# Patient Record
Sex: FEMALE | Race: WHITE | NOT HISPANIC OR LATINO | Employment: OTHER
[De-identification: names, ages, dates, MRNs, and addresses within clinical notes are randomized per-mention and may not be internally consistent; named-entity substitution may affect disease eponyms.]

---

## 2019-10-01 ENCOUNTER — HEALTH MAINTENANCE LETTER (OUTPATIENT)
Age: 74
End: 2019-10-01

## 2019-12-15 ENCOUNTER — HEALTH MAINTENANCE LETTER (OUTPATIENT)
Age: 74
End: 2019-12-15

## 2021-01-15 ENCOUNTER — HEALTH MAINTENANCE LETTER (OUTPATIENT)
Age: 76
End: 2021-01-15

## 2021-09-04 ENCOUNTER — HEALTH MAINTENANCE LETTER (OUTPATIENT)
Age: 76
End: 2021-09-04

## 2021-10-30 ENCOUNTER — HEALTH MAINTENANCE LETTER (OUTPATIENT)
Age: 76
End: 2021-10-30

## 2022-02-19 ENCOUNTER — HEALTH MAINTENANCE LETTER (OUTPATIENT)
Age: 77
End: 2022-02-19

## 2022-09-22 ENCOUNTER — TRANSFERRED RECORDS (OUTPATIENT)
Dept: HEALTH INFORMATION MANAGEMENT | Facility: CLINIC | Age: 77
End: 2022-09-22

## 2022-09-26 ENCOUNTER — TRANSFERRED RECORDS (OUTPATIENT)
Dept: HEALTH INFORMATION MANAGEMENT | Facility: CLINIC | Age: 77
End: 2022-09-26

## 2022-10-16 ENCOUNTER — HEALTH MAINTENANCE LETTER (OUTPATIENT)
Age: 77
End: 2022-10-16

## 2022-10-24 ENCOUNTER — TRANSFERRED RECORDS (OUTPATIENT)
Dept: HEALTH INFORMATION MANAGEMENT | Facility: CLINIC | Age: 77
End: 2022-10-24

## 2023-01-27 ENCOUNTER — TRANSFERRED RECORDS (OUTPATIENT)
Dept: HEALTH INFORMATION MANAGEMENT | Facility: CLINIC | Age: 78
End: 2023-01-27

## 2023-03-29 ENCOUNTER — TRANSFERRED RECORDS (OUTPATIENT)
Dept: HEALTH INFORMATION MANAGEMENT | Facility: CLINIC | Age: 78
End: 2023-03-29

## 2023-04-01 ENCOUNTER — HEALTH MAINTENANCE LETTER (OUTPATIENT)
Age: 78
End: 2023-04-01

## 2023-05-18 ENCOUNTER — TRANSFERRED RECORDS (OUTPATIENT)
Dept: HEALTH INFORMATION MANAGEMENT | Facility: CLINIC | Age: 78
End: 2023-05-18

## 2023-08-25 NOTE — TELEPHONE ENCOUNTER
FUTURE VISIT INFORMATION:      FUTURE VISIT INFORMATION:  Date: 11/21/23   Time: 2 PM with SLP  [place] [facility] [place]FORMATION:  Referring provider:   Referring providers clinic:   Reason for visit/diagnosis:  New per pt, having globus issues, voice etc. Was seeing [place] [facility] who told her to come-no formal ref. req'd pt to have med recs sent.     RECORDS REQUESTED FROM:       Clinic name Comments Records Status Imaging Status   [facility] [address] Speech Language Pathology    6/28/23 note- Faviola Amato, SLP    CE    Imaging XR Chest 8/16/23  [place] Video Swallow 6/28/23 CE Req 8/25/23  PACS   [facility] - ENT   8/16/23- note Maria Ines Campbell PA-C    6/7/23 and  12/21/22 note- Ariane Whatley MD   CE    [facility] Gastroenterology     6/18/20 note- Jony Holbrook MD   CE    [facility] Gastroenterology  P: [phone]  F: [phone] 2022- 2023 progress notes - Jamey Leong, DO     9/28/22 EGD Pending    Received, send to scan 10/5    [address], [address]  [place] [address]  Tel: [phone]  Fax: [phone] 3/29/2023 Video swallow  [employer]:  [id number] Pending    Received, send to scan 10/5 Pending req  Req 10/9/23    Disc received 10/12/23      August 25, 2023 at 4:50 PM - request recs from [facility] and images pushed from [facility]  September 13, 2023 12:33 PM - Received image in pacs and attached it to patient- Ashley   September 13, 2023 12:33 PM - Called [facility] to check the status but they do not have her in the system and had no records on file - Ashley   October 3, 2023 at 2:58 PM - Called the patient to discuss outside records GI in [place]. Patient stated she saw Dr [facility]ong [facility] [place]. She had EGD one year ago in September. Also had a barrium study and modify swallow. Patient does have copy of her records and can send to me via email. Provided my email address. Patient is not able to send it today but will  do that tomorrow, sometimes this week. -[facility]  October 5, 2023 at 9:23 AM - received from patient records from GI visit in [place]. Send to scan. TYRESE emailed to obtain image from [facility] center -[facility]  October 9, 2023 at 9:13 AM - Received signed TYRESE for [facility] and request for disc. TYRESE send to scanning -[facility]  October 12, 2023 10:01 AM - Received imaging disc 3/29/23 from [facility] and dropped off at [facility] to upload to PACS.-[facility]  October 13, 2023 4:00 PM - Disc broken. Sent message to [facility] to request new disc to upload. - Marilyn  October 16, 2023 10:23 AM - Resend and request for new disc from [facility] [facility]  October 23, 2023 7:15 AM - Received new disc from [email or web address] and dropped off at [facility] to upload to PACS.-Edwina

## 2023-09-19 ENCOUNTER — MEDICAL CORRESPONDENCE (OUTPATIENT)
Dept: HEALTH INFORMATION MANAGEMENT | Facility: CLINIC | Age: 78
End: 2023-09-19

## 2023-11-20 ENCOUNTER — TELEPHONE (OUTPATIENT)
Dept: OTOLARYNGOLOGY | Facility: CLINIC | Age: 78
End: 2023-11-20
Payer: COMMERCIAL

## 2023-11-21 ENCOUNTER — PRE VISIT (OUTPATIENT)
Dept: OTOLARYNGOLOGY | Facility: CLINIC | Age: 78
End: 2023-11-21

## 2023-11-21 ENCOUNTER — THERAPY VISIT (OUTPATIENT)
Dept: SPEECH THERAPY | Facility: CLINIC | Age: 78
End: 2023-11-21
Payer: COMMERCIAL

## 2023-11-21 ENCOUNTER — OFFICE VISIT (OUTPATIENT)
Dept: OTOLARYNGOLOGY | Facility: CLINIC | Age: 78
End: 2023-11-21
Payer: COMMERCIAL

## 2023-11-21 VITALS — SYSTOLIC BLOOD PRESSURE: 184 MMHG | DIASTOLIC BLOOD PRESSURE: 102 MMHG | HEART RATE: 74 BPM

## 2023-11-21 DIAGNOSIS — R09.A2 GLOBUS SENSATION: Primary | ICD-10-CM

## 2023-11-21 DIAGNOSIS — R09.89 CHRONIC THROAT CLEARING: ICD-10-CM

## 2023-11-21 DIAGNOSIS — J38.7 LARYNGEAL HYPERFUNCTION: ICD-10-CM

## 2023-11-21 DIAGNOSIS — R13.12 DYSPHAGIA, OROPHARYNGEAL PHASE: Primary | ICD-10-CM

## 2023-11-21 DIAGNOSIS — R49.0 DYSPHONIA: ICD-10-CM

## 2023-11-21 DIAGNOSIS — J38.7 IRRITABLE LARYNX SYNDROME: ICD-10-CM

## 2023-11-21 DIAGNOSIS — R49.0 DYSPHONIA: Primary | ICD-10-CM

## 2023-11-21 PROCEDURE — 92524 BEHAVRAL QUALIT ANALYS VOICE: CPT | Mod: GN | Performed by: SPEECH-LANGUAGE PATHOLOGIST

## 2023-11-21 PROCEDURE — 31579 LARYNGOSCOPY TELESCOPIC: CPT | Performed by: OTOLARYNGOLOGY

## 2023-11-21 PROCEDURE — 99204 OFFICE O/P NEW MOD 45 MIN: CPT | Mod: 25 | Performed by: OTOLARYNGOLOGY

## 2023-11-21 PROCEDURE — 92610 EVALUATE SWALLOWING FUNCTION: CPT | Mod: GN | Performed by: SPEECH-LANGUAGE PATHOLOGIST

## 2023-11-21 PROCEDURE — 92612 ENDOSCOPY SWALLOW (FEES) VID: CPT | Mod: GN | Performed by: OTOLARYNGOLOGY

## 2023-11-21 PROCEDURE — 92613 ENDOSCOPY SWALLOW (FEES) I&R: CPT | Performed by: OTOLARYNGOLOGY

## 2023-11-21 RX ORDER — IPRATROPIUM BROMIDE 42 UG/1
SPRAY, METERED NASAL
COMMUNITY
Start: 2023-11-06

## 2023-11-21 RX ORDER — UBIDECARENONE 100 MG
CAPSULE ORAL
COMMUNITY

## 2023-11-21 RX ORDER — DIPHENOXYLATE HYDROCHLORIDE AND ATROPINE SULFATE 2.5; .025 MG/1; MG/1
1 TABLET ORAL DAILY
COMMUNITY

## 2023-11-21 RX ORDER — FAMOTIDINE 40 MG/1
40 TABLET, FILM COATED ORAL 2 TIMES DAILY
COMMUNITY

## 2023-11-21 RX ORDER — GLUCOSAMINE/CHONDR SU A SOD 750-600 MG
TABLET ORAL DAILY
COMMUNITY

## 2023-11-21 RX ORDER — LORAZEPAM 0.5 MG/1
TABLET ORAL
COMMUNITY

## 2023-11-21 RX ORDER — ESTRADIOL 10 UG/1
10 INSERT VAGINAL
COMMUNITY

## 2023-11-21 RX ORDER — PHENOL 1.4 %
600 AEROSOL, SPRAY (ML) MUCOUS MEMBRANE
COMMUNITY

## 2023-11-21 RX ORDER — ATORVASTATIN CALCIUM 40 MG/1
TABLET, FILM COATED ORAL DAILY
COMMUNITY

## 2023-11-21 RX ORDER — SUCRALFATE 1 G/1
TABLET ORAL
COMMUNITY
Start: 2023-01-15

## 2023-11-21 RX ORDER — LEVOTHYROXINE SODIUM 100 UG/1
TABLET ORAL DAILY
COMMUNITY

## 2023-11-21 RX ORDER — CETIRIZINE HYDROCHLORIDE 10 MG/1
10 TABLET ORAL DAILY
COMMUNITY

## 2023-11-21 ASSESSMENT — PAIN SCALES - GENERAL: PAINLEVEL: NO PAIN (0)

## 2023-11-21 NOTE — PATIENT INSTRUCTIONS
1.  You were seen in the ENT Clinic today by . If you have any questions or concerns after your appointment, please call [phone]. Press option #1 for scheduling related needs. Press option #3 for Nurse advice.    2.   has recommended  the following:   - voice therapy   - discuss referral to dietician with your primary care provider    3.  Plan is to return to clinic after last session of voice therapy      Lydia Sheth LPN  [phone]  [facility] Otolaryngology

## 2023-11-21 NOTE — PROGRESS NOTES
[facility]   at the [facility]   Otolaryngology Clinic     Patient: Soraida Deshpande    MRN: [id number]    : 1945    Age/Gender: 78 year old female  Date of Service: 2023  Rendering Provider:   Mary Calderon MD     Referring Provider   PCP: Kumar Haley  Referring Physician: Donald Stephens MD  No address on file  Reason for Consultation   Globus sensation  Dysphonia  History   HISTORY OF PRESENT ILLNESS: Ms. Deshpande is a 78 year old female who presents to us today with globus sensation and dysphonia.      Of note she had a bilateral thyroidectomy in     she presents today with her  for evaluation. she reports:    Globus sensation  - unsure if she has silent reflux, because mucus collects in her throat  - the mucus in the back of her throat, voice changes, and PND is what bothers her the most- began around 2 years ago  - lost 20 pounds, and believes it is because of the silent reflux- her weight dropped pretty fast  - an allergist told her she may have silent reflux, which prompted change in diet and weight loss   - change toothpaste to help with reflux- brushes her teeth 3 times daily  -  Has a constant bitter taste with coughing and throat clearing- more throat clearing than coughing  - unsure if coughing makes her clear her throat more  - coughs sometimes when she lays down at night- doesn't wake up from coughing  - had a previous sore throat because of reflux- does currently take reflux meds  - consistent ear infections   - was treated with Levaquin for pseudomonias  - saw 3 ENTS and a pulmonologist in [place], who performed a fiberoptic bronchoscopy  - has to have a total 6 cultures- her most recent culture resulted in microbacterium, a lung infection  - had a regular upper GI endoscopy one year ago  - following a diet of no chocolate, no carbonated drinks, no salty or sweet foods  -  doesn't eat after dinner- eats 4 hours before bedtime  - feels a constant  sensation of something getting caught in your throat- getting food down her throat helps  - if she has a completely clean meal and watches what she eats, the sensation will go away- almond milk sometimes bother her, worse at night than in the morning  - doesn't seem like talking makes the sensation worse  - not eating makes the sensation better  - hoarseness of voice is worse with talking- noticed while conversing with friends  - sleeps elevated at nighttime  - breathing seems to be okay- still waiting on results from pulmonologist  - hurts a little when taking a deep breath- can take her daily evening walk  - her  says she does ist down and take a break at the end of her daily walk  - has used neti pot and sinus rinses for post-nasal drip- stopped using neti pot, as she developed an infection        PAST MEDICAL HISTORY:   Past Medical History:   Diagnosis Date    ABSENCE OF MENSTRUATION     postmenopausal    FX HUMERUS SHAFT-CLOSED     URIN TRACT INFECTION NOS     recurrent       PAST SURGICAL HISTORY:   Past Surgical History:   Procedure Laterality Date    NO HISTORY OF SURGERY         CURRENT MEDICATIONS:   Current Outpatient Medications:     ASPIRIN 81 MG OR TABS,    1 po qd, Disp: , Rfl:     B COMPLEX 1 OR TABS,    1 po qd, Disp: , Rfl:     CALCIUM + D 600-200 MG-IU OR TABS,       2 po qd, Disp: , Rfl:     FEMHRT 1/5 1-5 MG-MCG OR TABS,    1 po qd, Disp: , Rfl:     FISH OIL 1000 MG OR CAPS,     2 grams po qd, Disp: , Rfl:     FLAX SEED OIL 1000 MG OR CAPS, daily, Disp: , Rfl: 0    FOLIC ACID 400 MCG OR TABS, 1 TABLET DAILY, Disp: 30, Rfl: 0    VITAMIN C 500 MG OR TABS,    1 po qd, Disp: , Rfl:     VITAMIN E 400 IU OR CAPS,    1 po qd, Disp: , Rfl:     ALLERGIES: Trimethoprim    SOCIAL HISTORY:    Social History     Socioeconomic History    Marital status:      Spouse name: Not on file    Number of children: Not on file    Years of education: Not on file    Highest education level: Not on file    Occupational History    Not on file   Tobacco Use    Smoking status: Former     Types: Cigarettes     Quit date: 1975     Years since quittin.0    Smokeless tobacco: Not on file    Tobacco comments:     quit at age 30 after smoking for 6 years at 1 ppd   Substance and Sexual Activity    Alcohol use: Yes     Comment: 7-10 per week    Drug use: No    Sexual activity: Not on file   Other Topics Concern    Not on file   Social History Narrative    Not on file     Social Determinants of Health     Financial Resource Strain: Not on file   Food Insecurity: Not on file   Transportation Needs: Not on file   Physical Activity: Not on file   Stress: Not on file   Social Connections: Not on file   Interpersonal Safety: Not on file   Housing Stability: Not on file         FAMILY HISTORY:   Family History   Problem Relation Age of Onset    C.A.D. Father         first MI in his 40's    Cerebrovascular Disease Father     Osteoporosis Mother     Cardiovascular Mother         carotid endarterectomy, pacemaker    Hypertension Mother     Breast Cancer Paternal Aunt     Cancer - colorectal Maternal Aunt      Non-contributory for problems with anesthesia    REVIEW OF SYSTEMS:   The patient was asked a 14 point review of systems regarding constitutional symptoms, eye symptoms, ears, nose, mouth, throat symptoms, cardiovascular symptoms, respiratory symptoms, gastrointestinal symptoms, genitourinary symptoms, musculoskeletal symptoms, integumentary symptoms, neurological symptoms, psychiatric symptoms, endocrine symptoms, hematologic/lymphatic symptoms, and allergic/ immunologic symptoms.   The pertinent factors have been included in the HPI and below.  Patient Supplied Answers to Review of Systems       No data to display                Physical Examination   The patient underwent a physical examination as described below. The pertinent positive and negative findings are summarized after the description of the  examination.  Constitutional: The patient's developmental and nutritional status was assessed. The patient's voice quality was assessed.  Head and Face: The head and face were inspected for deformities. The sinuses were palpated. The salivary glands were palpated. Facial muscle strength was assessed bilaterally.  Eyes: Extraocular movements and primary gaze alignment were assessed.  Ears, Nose, Mouth and Throat: The ears and nose were examined for deformities. The nasal septum, mucosa, and turbinates were inspected by anterior rhinoscopy. The lips, teeth, and gums were examined for abnormalities. The oral mucosa, tongue, palate, tonsils, lateral and posterior pharynx were inspected for the presence of asymmetry or mucosal lesions.    Neck: The tracheal position was noted, and the neck mass palpated to determine if there were any asymmetries, abnormal neck masses, thyromegally, or thyroid nodules.  Respiratory: The nature of the breathing and chest expansion/symmetry was observed.  Cardiovascular: The patient was examined to determine the presence of any edema or jugular venous distension.  Abdomen: The contour of the abdomen was noted.  Lymphatic: The patient was examined for infraclavicular lymphadenopathy.  Musculoskeletal: The patient was inspected for the presence of skeletal deformities.  Extremities: The extremities were examined for any clubbing or cyanosis.  Skin: The skin was examined for inflammatory or neoplastic conditions.  Neurologic: The patient's orientation, mood, and affect were noted. The cranial nerve  functions were examined.  Other pertinent positive and negative findings on physical examination:      OC/OP: no lesions, uvula midline, soft palate elevates symmetrically   Neck: no lesions, no TH tenderness to palpation, with sensation to cough and throat clear     All other physical examination findings were within normal limits and noncontributory.  Procedures     Video Laryngoscopy with  Stroboscopy (CPT 28924)    Preoperative Diagnosis:  Dysphonia and throat symptoms  Postoperative Diagnosis: Dysphonia and throat symptoms  Indication:  Patient has new or persistent dysphonia and throat symptoms.  This requires evaluation by stroboscopy to fully delineate the laryngeal functioning; especially mucosal wave assessment, ultrasharp visualization of lesions on the vocal folds, and overall functioning of the larynx.  Details of Procedure: A 70 degree rigid telescopic laryngoscope or a distal chip flexible scope was used. The lighting was obtained via a light cable connected to a stroboscopic unit. The telescope was inserted either transorally or transnasally until the vocal folds could be visualized. The patient was instructed to sustain the vowel  ee  at a comfortable pitch and loudness as the voice was monitored by a microphone connected to a fundamental frequency tracker. This circuit tracked vocal periodicity, allowing the light to flash in synchrony with the glottal cycles. A setting on the stroboscope was set to change the phase of light strobing with relation to the vocal fundamental frequency, producing an image of 1 to 2 glottal cycles every second. The video images were recorded for analysis. Use of the variable speed, slow and stop scan allowed careful study of pertinent segments of laryngeal function to increase accuracy of clinical assessments of function and dysfunction.  In particular, features of glottal closure, mucosal wave on the vocal fold cover and laryngeal symmetry were analyzed. Lastly, the patient was asked to phonate speech samples and auditory/perceptual evaluation of voice and resonance were performed.  The vocal quality was carefully evaluated for hoarseness, breathiness, loudness, phrase length and intelligibility to determine the source of dysphonia.    Findings:      B. LARYNGOVIDEOSTROBOSCOPY   Anatomic/Physiological Deviations:  RNC, left false vocal fold swelling that  impairs vibration of the left true vocal fold, no pooling of saliva  Mucosal wave: Right:  No restriction     Left: Little restriction  Bilateral Vocal Fold Vibration: Asymmetric  Vocal Process: Right: No restriction    Left:  No restriction  Vocal Fold closure: Complete glottal closure    Complication(s): None  Disposition: Patient tolerated the procedure well         Fiberoptic Endoscopic Evaluation of Swallowing (CPT 16311)  and Interpretation of Swallowing (CPT 56046)    Indications: See above notes for complete history and physical. Patient complains of dysphagia to both solids and liquids and/or there is suggestion on history and endoscopic exam of the presence of dysphagia causing medical complaints.  Swallowing evaluation is being performed to assess the presence and degree of dysphagia, and to recommend a safe diet.     Pulmonary Status:  No PNA   Current Diet:              regular                                        thin liquids      Consistency Amounts:  Thin Liquid: sip   Puree: sip  Solid: cookies            Positioning: upright in a chair  Oral Peripheral Exam: See physical exam section.  Anatomic Notes: See Videostroboscopy report for assessment of anatomy and laryngeal functioning  Pharyngeal secretions prior to administration of liquid or food: No    Oral Phase Abnormal Findings: No abnormal behavior observed    Pharyngeal Phase Abnormal Findings: no penetration, no aspiration , throat cleared with apple juice    Recommended Diet:  regular                                        thin liquids            Review of Relevant Clinical Data   I personally reviewed:  Notes: Dr. Ariane Whatley, ENT-[facility], 6/7/23  Assessment and Plan  Soraida Deshpande is a 78 y.o. female has symptoms of globus, postnasal drip, hoarseness. 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 She has several other additional  concerns today which are addressed as below.    Sleep referral ordered    Defer CT chest follow up to primary    Modified Barium swallow study ordered to assess aspiration     Patulous eustachian tube - balance between nose and ear - can trial and error with nasal sprays, zyrtec, etc to find a good balance. Could try to treat however this would make nasal congestion worse.     Vasomotor rhinitis - atrovent nasal spray as needed    White nodule in trachea unchanged -> no need to follow up    Muscle tension dysphonia, prebylarynx-> consider speech therapy in [place] - video visit possible     Dr. Jony Holbrook, GastroENT, 6/18/20  Assessment/Plan:  In summary: Soraida Deshpande had epigastric burning discomfort that responded to stopping ibuprofen, avoiding coffee, alcohol, chocolate and spicy foods, and taking Nexium for 6 weeks. Acid peptic disease was suspected. She feels well today and is not interested in further evaluation at this time. I asked her to stay off ibuprofen in the future. She will follow up in GI clinic as needed. If the epigastric discomfort returns we will consider checking stool for Helicobacter pylori and/or scheduling an endoscopy.    6/28/23      Radiology: Chest Xray 8/16/23  IMPRESSION:  Centrilobular nodularity demonstrated within the right upper lobe, pneumonia versus sequela of prior infectious/inflammatory process. Consider follow-up radiograph in one month to ensure resolution. Normal cardiomediastinal silhouette and pulmonary vasculature. No pneumothorax or pleural effusion. Bony thorax is unremarkable.      Pathology: Gastrointestinal pathology report 9/28/22    Procedures: VFSS 6/28/23  Impression: within normal limits    Upper GI 6/28/23  Impression: There was no aspiration or penetration with any consistency.     Bronchoscopy 10/23/23  Impression:  1. Generalized bronchiectasis  2. Copious amount of secretions in the right upper lobe, right middle lobe, superior segment right lower lobe  "and lingula  3. Excessive dynamic compression in the main trachea and mainstem bronchi and also in the right lower lobe bronchi  4. No endobronchial lesion seen         Labs:  Lab Results   Component Value Date    TSH 0.86 11/22/2005     No results found for: \"NA\", \"CO2\", \"BUN\", \"CREAT\", \"GLUCOSE\", \"PHOS\"  No results found for: \"WBC\", \"HGB\", \"HCT\", \"MCV\", \"PLT\"  No results found for: \"PT\", \"PTT\", \"INR\"  No results found for: \"DALLAS\"  No components found for: \"RHEUMATOIDFACTOR\", \"RF\"  No results found for: \"CRP\"  No components found for: \"CKTOT\", \"URICACID\"  No components found for: \"C3\", \"C4\", \"DSDNAAB\", \"NDNAABIFA\"  No results found for: \"MPOAB\"    Patient reported Quality of Life (QOL) Measures   Patient Supplied Answers To VHI Questionnaire       No data to display                  Patient Supplied Answers To EAT Questionnaire       No data to display                  Patient Supplied Answers To CSI Questionnaire       No data to display                  Patient Supplied Answers to Dyspnea Index Questionnaire:       No data to display                Impression & Plan     IMPRESSION: Ms. Deshpande is a 78 year old female who is being seen for the following:    Chronic cough/throat clearing   - started 2 years ago   - has improved overtime  - associated with sensation of mucus  - has associated sensation of globus sensation  - denies cigarettes  - allergy triggers and work up: reports negative work up  - pulmonary triggers and work up: does have pulmonary infection on bronchoscopy, cough improved once she started treatment for this - she is still undergoing treatment  - GI triggers and work up: denies reflux, denies AM LPRD symptoms, started antireflux diet because this helps her mucus build up - she lost 20 lbs because of this - worried that reflux is making her lung infection worse, had EGD with finding of inlet patch   - ENT triggers and work up: throat clears more with talking  Xray Video Swallow Exam 6/2023 reviewed " today, looks normal   - scope shows left false vocal fold swelling that impairs vibration of the left true vocal fold, no pooling of saliva  - FEES shows no penetration, no aspiration, throat cleared with apple juice  - symptoms likely due to pulmonary etiology given infection, but now she also has irritable larynx syndrome and muscle tension dysphonia - she had throat clearing with the apple juice pointing to irritable larynx syndrome   Plan  - cough/throat clearing suppression therapy  - continue antireflux diet and protocol (H2 blocker and gaviscon)  - I do not think she has silent reflux given no morning AM LPRD - I think it is rather irritable larynx syndrome   - discussed reflux would make diagnoses as asthma worse rather than a lung infection         2. 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     Provider Disclosure:  I agree with above History, Review of Systems, Physical exam and Plan.  I have reviewed the content of the documentation and have edited it as needed. I have personally performed the services documented here and the documentation accurately represents those services and  the decisions I have made.        Thank you for the kind referral and for allowing me to share in the care of Ms. Deshpande. If you have any questions, please do not hesitate to contact me.    Mary Calderon MD    Laryngology    [facility]  Department of  Otolaryngology - Head and Neck Surgery  Clinics & Surgery Center  [address] [place] [id number]  Appointment line: [phone]  Fax: [phone]  [email or web address]

## 2023-11-21 NOTE — LETTER
2023       RE: Soraida Deshpande  [address] [facility] [id number]     Dear Colleague,    Thank you for referring your patient, Soraida Deshpande, to the [facility] EAR NOSE AND THROAT [facility]INIC [place] at [facility]. Please see a copy of my visit note below.        [facility]   at the [facility]   Otolaryngology Clinic     Patient: Soraida Deshpande    MRN: [id number]    : 1945    Age/Gender: 78 year old female  Date of Service: 2023  Rendering Provider:   Mary Calderon MD     Referring Provider   PCP: Kumar Haley  Referring Physician: Referred MD Angelo  No address on file  Reason for Consultation   Globus sensation  Dysphonia  History   HISTORY OF PRESENT ILLNESS: Ms. Deshpande is a 78 year old female who presents to us today with globus sensation and dysphonia.      Of note she had a bilateral thyroidectomy in     she presents today with her  for evaluation. she reports:    Globus sensation  - unsure if she has silent reflux, because mucus collects in her throat  - the mucus in the back of her throat, voice changes, and PND is what bothers her the most- began around 2 years ago  - lost 20 pounds, and believes it is because of the silent reflux- her weight dropped pretty fast  - an allergist told her she may have silent reflux, which prompted change in diet and weight loss   - change toothpaste to help with reflux- brushes her teeth 3 times daily  -  Has a constant bitter taste with coughing and throat clearing- more throat clearing than coughing  - unsure if coughing makes her clear her throat more  - coughs sometimes when she lays down at night- doesn't wake up from coughing  - had a previous sore throat because of reflux- does currently take reflux meds  - consistent ear infections   - was treated with Levaquin for pseudomonias  - saw 3 ENTS and a pulmonologist in [place], who performed a fiberoptic  bronchoscopy  - has to have a total 6 cultures- her most recent culture resulted in microbacterium, a lung infection  - had a regular upper GI endoscopy one year ago  - following a diet of no chocolate, no carbonated drinks, no salty or sweet foods  -  doesn't eat after dinner- eats 4 hours before bedtime  - feels a constant sensation of something getting caught in your throat- getting food down her throat helps  - if she has a completely clean meal and watches what she eats, the sensation will go away- almond milk sometimes bother her, worse at night than in the morning  - doesn't seem like talking makes the sensation worse  - not eating makes the sensation better  - hoarseness of voice is worse with talking- noticed while conversing with friends  - sleeps elevated at nighttime  - breathing seems to be okay- still waiting on results from pulmonologist  - hurts a little when taking a deep breath- can take her daily evening walk  - her  says she does ist down and take a break at the end of her daily walk  - has used neti pot and sinus rinses for post-nasal drip- stopped using neti pot, as she developed an infection        PAST MEDICAL HISTORY:   Past Medical History:   Diagnosis Date    ABSENCE OF MENSTRUATION     postmenopausal    FX HUMERUS SHAFT-CLOSED     URIN TRACT INFECTION NOS     recurrent       PAST SURGICAL HISTORY:   Past Surgical History:   Procedure Laterality Date    NO HISTORY OF SURGERY         CURRENT MEDICATIONS:   Current Outpatient Medications:     ASPIRIN 81 MG OR TABS,    1 po qd, Disp: , Rfl:     B COMPLEX 1 OR TABS,    1 po qd, Disp: , Rfl:     CALCIUM + D 600-200 MG-IU OR TABS,       2 po qd, Disp: , Rfl:     FEMHRT 1/5 1-5 MG-MCG OR TABS,    1 po qd, Disp: , Rfl:     FISH OIL 1000 MG OR CAPS,     2 grams po qd, Disp: , Rfl:     FLAX SEED OIL 1000 MG OR CAPS, daily, Disp: , Rfl: 0    FOLIC ACID 400 MCG OR TABS, 1 TABLET DAILY, Disp: 30, Rfl: 0    VITAMIN C 500 MG OR TABS,    1 po qd,  Disp: , Rfl:     VITAMIN E 400 IU OR CAPS,    1 po qd, Disp: , Rfl:     ALLERGIES: Trimethoprim    SOCIAL HISTORY:    Social History     Socioeconomic History    Marital status:      Spouse name: Not on file    Number of children: Not on file    Years of education: Not on file    Highest education level: Not on file   Occupational History    Not on file   Tobacco Use    Smoking status: Former     Types: Cigarettes     Quit date: 1975     Years since quittin.0    Smokeless tobacco: Not on file    Tobacco comments:     quit at age 30 after smoking for 6 years at 1 ppd   Substance and Sexual Activity    Alcohol use: Yes     Comment: 7-10 per week    Drug use: No    Sexual activity: Not on file   Other Topics Concern    Not on file   Social History Narrative    Not on file     Social Determinants of Health     Financial Resource Strain: Not on file   Food Insecurity: Not on file   Transportation Needs: Not on file   Physical Activity: Not on file   Stress: Not on file   Social Connections: Not on file   Interpersonal Safety: Not on file   Housing Stability: Not on file         FAMILY HISTORY:   Family History   Problem Relation Age of Onset    C.A.D. Father         first MI in his 40's    Cerebrovascular Disease Father     Osteoporosis Mother     Cardiovascular Mother         carotid endarterectomy, pacemaker    Hypertension Mother     Breast Cancer Paternal Aunt     Cancer - colorectal Maternal Aunt      Non-contributory for problems with anesthesia    REVIEW OF SYSTEMS:   The patient was asked a 14 point review of systems regarding constitutional symptoms, eye symptoms, ears, nose, mouth, throat symptoms, cardiovascular symptoms, respiratory symptoms, gastrointestinal symptoms, genitourinary symptoms, musculoskeletal symptoms, integumentary symptoms, neurological symptoms, psychiatric symptoms, endocrine symptoms, hematologic/lymphatic symptoms, and allergic/ immunologic symptoms.   The pertinent  factors have been included in the HPI and below.  Patient Supplied Answers to Review of Systems       No data to display                Physical Examination   The patient underwent a physical examination as described below. The pertinent positive and negative findings are summarized after the description of the examination.  Constitutional: The patient's developmental and nutritional status was assessed. The patient's voice quality was assessed.  Head and Face: The head and face were inspected for deformities. The sinuses were palpated. The salivary glands were palpated. Facial muscle strength was assessed bilaterally.  Eyes: Extraocular movements and primary gaze alignment were assessed.  Ears, Nose, Mouth and Throat: The ears and nose were examined for deformities. The nasal septum, mucosa, and turbinates were inspected by anterior rhinoscopy. The lips, teeth, and gums were examined for abnormalities. The oral mucosa, tongue, palate, tonsils, lateral and posterior pharynx were inspected for the presence of asymmetry or mucosal lesions.    Neck: The tracheal position was noted, and the neck mass palpated to determine if there were any asymmetries, abnormal neck masses, thyromegally, or thyroid nodules.  Respiratory: The nature of the breathing and chest expansion/symmetry was observed.  Cardiovascular: The patient was examined to determine the presence of any edema or jugular venous distension.  Abdomen: The contour of the abdomen was noted.  Lymphatic: The patient was examined for infraclavicular lymphadenopathy.  Musculoskeletal: The patient was inspected for the presence of skeletal deformities.  Extremities: The extremities were examined for any clubbing or cyanosis.  Skin: The skin was examined for inflammatory or neoplastic conditions.  Neurologic: The patient's orientation, mood, and affect were noted. The cranial nerve  functions were examined.  Other pertinent positive and negative findings on physical  examination:      OC/OP: no lesions, uvula midline, soft palate elevates symmetrically   Neck: no lesions, no TH tenderness to palpation, with sensation to cough and throat clear     All other physical examination findings were within normal limits and noncontributory.  Procedures     Video Laryngoscopy with Stroboscopy (CPT 50180)    Preoperative Diagnosis:  Dysphonia and throat symptoms  Postoperative Diagnosis: Dysphonia and throat symptoms  Indication:  Patient has new or persistent dysphonia and throat symptoms.  This requires evaluation by stroboscopy to fully delineate the laryngeal functioning; especially mucosal wave assessment, ultrasharp visualization of lesions on the vocal folds, and overall functioning of the larynx.  Details of Procedure: A 70 degree rigid telescopic laryngoscope or a distal chip flexible scope was used. The lighting was obtained via a light cable connected to a stroboscopic unit. The telescope was inserted either transorally or transnasally until the vocal folds could be visualized. The patient was instructed to sustain the vowel  ee  at a comfortable pitch and loudness as the voice was monitored by a microphone connected to a fundamental frequency tracker. This circuit tracked vocal periodicity, allowing the light to flash in synchrony with the glottal cycles. A setting on the stroboscope was set to change the phase of light strobing with relation to the vocal fundamental frequency, producing an image of 1 to 2 glottal cycles every second. The video images were recorded for analysis. Use of the variable speed, slow and stop scan allowed careful study of pertinent segments of laryngeal function to increase accuracy of clinical assessments of function and dysfunction.  In particular, features of glottal closure, mucosal wave on the vocal fold cover and laryngeal symmetry were analyzed. Lastly, the patient was asked to phonate speech samples and auditory/perceptual evaluation of voice  and resonance were performed.  The vocal quality was carefully evaluated for hoarseness, breathiness, loudness, phrase length and intelligibility to determine the source of dysphonia.    Findings:      B. LARYNGOVIDEOSTROBOSCOPY   Anatomic/Physiological Deviations:  RNC, left false vocal fold swelling that impairs vibration of the left true vocal fold, no pooling of saliva  Mucosal wave: Right:  No restriction     Left: Little restriction  Bilateral Vocal Fold Vibration: Asymmetric  Vocal Process: Right: No restriction    Left:  No restriction  Vocal Fold closure: Complete glottal closure    Complication(s): None  Disposition: Patient tolerated the procedure well         Fiberoptic Endoscopic Evaluation of Swallowing (CPT 72719)  and Interpretation of Swallowing (CPT 57183)    Indications: See above notes for complete history and physical. Patient complains of dysphagia to both solids and liquids and/or there is suggestion on history and endoscopic exam of the presence of dysphagia causing medical complaints.  Swallowing evaluation is being performed to assess the presence and degree of dysphagia, and to recommend a safe diet.     Pulmonary Status:  No PNA   Current Diet:              regular                                        thin liquids      Consistency Amounts:  Thin Liquid: sip   Puree: sip  Solid: cookies            Positioning: upright in a chair  Oral Peripheral Exam: See physical exam section.  Anatomic Notes: See Videostroboscopy report for assessment of anatomy and laryngeal functioning  Pharyngeal secretions prior to administration of liquid or food: No    Oral Phase Abnormal Findings: No abnormal behavior observed    Pharyngeal Phase Abnormal Findings: no penetration, no aspiration , throat cleared with apple juice    Recommended Diet:  regular                                        thin liquids            Review of Relevant Clinical Data   I personally reviewed:  Notes: Dr. Ariane Whatley,  [facility], 6/7/23  Assessment and Plan  Soraida Deshpande is a 78 y.o. female has symptoms of globus, postnasal drip, hoarseness. 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 She has several other additional concerns today which are addressed as below.    Sleep referral ordered    Defer CT chest follow up to primary    Modified Barium swallow study ordered to assess aspiration     Patulous eustachian tube - balance between nose and ear - can trial and error with nasal sprays, zyrtec, etc to find a good balance. Could try to treat however this would make nasal congestion worse.     Vasomotor rhinitis - atrovent nasal spray as needed    White nodule in trachea unchanged -> no need to follow up    Muscle tension dysphonia, prebylarynx-> consider speech therapy in [place] - video visit possible     Dr. Jony Holbrook, GastroENT, 6/18/20  Assessment/Plan:  In summary: Soraida Deshpande had epigastric burning discomfort that responded to stopping ibuprofen, avoiding coffee, alcohol, chocolate and spicy foods, and taking Nexium for 6 weeks. Acid peptic disease was suspected. She feels well today and is not interested in further evaluation at this time. I asked her to stay off ibuprofen in the future. She will follow up in GI clinic as needed. If the epigastric discomfort returns we will consider checking stool for Helicobacter pylori and/or scheduling an endoscopy.    6/28/23      Radiology: Chest Xray 8/16/23  IMPRESSION:  Centrilobular nodularity demonstrated within the right upper lobe, pneumonia versus sequela of prior infectious/inflammatory process. Consider follow-up radiograph in one month to ensure resolution. Normal cardiomediastinal silhouette and pulmonary vasculature. No pneumothorax or pleural effusion. Bony thorax is unremarkable.      Pathology: Gastrointestinal pathology report 9/28/22    Procedures: VFSS  "6/28/23  Impression: within normal limits    Upper GI 6/28/23  Impression: There was no aspiration or penetration with any consistency.     Bronchoscopy 10/23/23  Impression:  1. Generalized bronchiectasis  2. Copious amount of secretions in the right upper lobe, right middle lobe, superior segment right lower lobe and lingula  3. Excessive dynamic compression in the main trachea and mainstem bronchi and also in the right lower lobe bronchi  4. No endobronchial lesion seen         Labs:  Lab Results   Component Value Date    TSH 0.86 11/22/2005     No results found for: \"NA\", \"CO2\", \"BUN\", \"CREAT\", \"GLUCOSE\", \"PHOS\"  No results found for: \"WBC\", \"HGB\", \"HCT\", \"MCV\", \"PLT\"  No results found for: \"PT\", \"PTT\", \"INR\"  No results found for: \"DALLAS\"  No components found for: \"RHEUMATOIDFACTOR\", \"RF\"  No results found for: \"CRP\"  No components found for: \"CKTOT\", \"URICACID\"  No components found for: \"C3\", \"C4\", \"DSDNAAB\", \"NDNAABIFA\"  No results found for: \"MPOAB\"    Patient reported Quality of Life (QOL) Measures   Patient Supplied Answers To VHI Questionnaire       No data to display                  Patient Supplied Answers To EAT Questionnaire       No data to display                  Patient Supplied Answers To CSI Questionnaire       No data to display                  Patient Supplied Answers to Dyspnea Index Questionnaire:       No data to display                Impression & Plan     IMPRESSION: Ms. Deshpande is a 78 year old female who is being seen for the following:    Chronic cough/throat clearing   - started 2 years ago   - has improved overtime  - associated with sensation of mucus  - has associated sensation of globus sensation  - denies cigarettes  - allergy triggers and work up: reports negative work up  - pulmonary triggers and work up: does have pulmonary infection on bronchoscopy, cough improved once she started treatment for this - she is still undergoing treatment  - GI triggers and work up: denies reflux, " denies AM LPRD symptoms, started antireflux diet because this helps her mucus build up - she lost 20 lbs because of this - worried that reflux is making her lung infection worse, had EGD with finding of inlet patch   - ENT triggers and work up: throat clears more with talking  Xray Video Swallow Exam 6/2023 reviewed today, looks normal   - scope shows left false vocal fold swelling that impairs vibration of the left true vocal fold, no pooling of saliva  - FEES shows no penetration, no aspiration, throat cleared with apple juice  - symptoms likely due to pulmonary etiology given infection, but now she also has irritable larynx syndrome and muscle tension dysphonia - she had throat clearing with the apple juice pointing to irritable larynx syndrome   Plan  - cough/throat clearing suppression therapy  - continue antireflux diet and protocol (H2 blocker and gaviscon)  - I do not think she has silent reflux given no morning AM LPRD - I think it is rather irritable larynx syndrome   - discussed reflux would make diagnoses as asthma worse rather than a lung infection         2. 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     Provider Disclosure:  I agree with above History, Review of Systems, Physical exam and Plan.  I have reviewed the content of the documentation and have edited it as needed. I have personally performed the services documented here and the documentation accurately represents those services and the decisions I have made.        Thank you for the kind referral and for allowing me to share in the care of Ms. Deshpande. If you have any questions, please do not hesitate to contact me.    Mary Calderon MD    Laryngology    [facility]  Department of  Otolaryngology - Head and Neck Surgery  Clinics [place] [address]  Appointment line: [phone]  Fax: [phone]  [email or web address]

## 2023-11-21 NOTE — PROGRESS NOTES
SPEECH LANGUAGE PATHOLOGY EVALUATION  Clinical Swallow Evaluation visualized under endoscopy    See electronic medical record for Abuse and Falls Screening details.    Subjective      Presenting condition or subjective complaint:  Pt is a 78 year old female that was seen by speech therapy in multidisciplinary clinic for complaints of globus sensation, mucus, throat clearing and cough. Pt reports she is on a regular diet with thin liquids, but closely follows a low acid diet.   Date of onset:      Relevant medical history:   GERD, thyroidectomy  Dates & types of surgery:       Prior diagnostic imaging/testing results:     VFSS in June 2023-WNL  Prior therapy history for the same diagnosis, illness or injury:         Living Environment  Social support:     Help at home:    Equipment owned:       Employment:      Hobbies/Interests:      Patient goals for therapy:      Pain assessment: Pain denied     Objective     SWALLOW EVALUTION  Dysphagia history: No history of dysphagia  Current Diet/Method of Nutritional Intake: thin liquids (level 0), regular diet        CLINICAL SWALLOW EVALUATION  Oral Motor Function: generally intact     Level of assist required for feeding: no assistance needed   Textures Trialed:   Clinical Swallow Eval: Thin Liquids  Mode of presentation: spoon, self-fed   Volume presented: 6 oz  Preparatory Phase:   Oral Phase:   Pharyngeal phase of swallow: intact   Strategies trialed during procedure: ANY SLP CLINICAL EVAL STRATEGIES: none    Diagnostic statement: No penetration, no aspiration. Noted throat clearing with thin applejuice, but not with thin water    Clinical Swallow Eval: Purees  Mode of presentation: spoon, self-fed   Volume presented: 2 oz  Preparatory Phase:   Oral Phase:   Pharyngeal phase of swallow: intact   Strategies trialed during procedure: ANY SLP CLINICAL EVAL STRATEGIES: none    Diagnostic statement: No penetration, no aspiration. Minimal residue in the right lateral  "channel/pyriforms    Clinical Swallow Eval: Solids  Mode of presentation: self-fed   Volume presented: 1 cookie  Preparatory Phase:   Oral Phase:   Pharyngeal phase of swallow: intact   Strategies trialed during procedure: ANY SLP CLINICAL EVAL STRATEGIES: none    Diagnostic statement: No penetration, no aspiration visualized. No pharyngeal residue. Pt did report she felt the solid \"push the mucus down\"         ESOPHAGEAL PHASE OF SWALLOW  patient reports symptoms of esophageal dysphagia     SWALLOW ASSESSMENT CLINICAL IMPRESSIONS AND RATIONALE  Diet Consistency Recommendations: thin liquids (level 0), regular diet    Recommended Feeding/Eating Techniques: small bolus size   Medication Administration Recommendations: pills with water  Instrumental Assessment Recommendations:      Assessment & Plan   CLINICAL IMPRESSIONS   Medical Diagnosis:      Treatment Diagnosis:     Impression/Assessment:    Overall, pt presents with a safe, functional oropharyngeal swallow. Oral motor assessment demonstrates adequate lingual, labial and buccal strength. Oral phase demonstrates adequate AP transit of bolus and adequate bolus manipulation. Pharyngeal phase demonstrates a timely swallow response, adequate base of tongue retraction and pharyngeal constriction. No penetration or aspiration was observed today on any trials. Pt did not have significant pharyngeal residue that would indicate weakness. Pt reports that the solid trial helped to push down the sensation of phlegm. Trials of thin apple juice did trigger throat clearing. Recommend Pt continue with a regular diet with thin liquids. Pt was educated on the anatomy and the results of the swallow assessment. Pt was educated on recommendations and agrees with the plan of care.        PLAN OF CARE  Treatment Interventions:  none, eval only    Education Assessment:        Risks and benefits of evaluation/treatment have been explained.   Patient/Family/caregiver agrees with Plan of " Care.     Evaluation Time:          Present: Not applicable     Signing Clinician: Annamarie ADAMS [facility]                                                                                   OUTPATIENT SPEECH LANGUAGE PATHOLOGY

## 2023-11-21 NOTE — PROGRESS NOTES
Outpatient Speech Language Therapy Evaluation - MEDICARE CERTIFICATION    PLAN OF TREATMENT FOR OUTPATIENT REHABILITATION  (COMPLETE FOR INITIAL CLAIMS ONLY)  Patient's Last Name, First Name, M.I.  YOB: 1945  Soarida Deshpande                        Provider's Name  Ana Alvarado M.A., CCC-SLP Medical Record No.  [id number]                              Onset Date:  11/21/2023   Start of Care Date: 11/21/2023     Type: Speech Language Therapy Medical Diagnosis: Dysphonia (R49.0)  Laryngeal Hyperfunction (J38.7)  Irritable Larynx Syndrome (ILS) (J38.7)  Chronic Throat Clearing (R68.89)  Globus Sensation (R09.89                        Therapy Diagnosis: Dysphonia (R49.0)  Laryngeal Hyperfunction (J38.7)  Irritable Larynx Syndrome (ILS) (J38.7)  Chronic Throat Clearing (R68.89)  Globus Sensation (R09.89   Visits from SOC:  1/10   _________________________________________________________________________________  Plan of Treatment:   Speech therapy    RECOMMENDATIONS:   A course of speech therapy is recommended to improve voice quality and promote reduced discomfort, effort and fatigue.  She demonstrates a Good prognosis for improvement given adherence to therapeutic recommendations. Therapeutic   Positive indicators: commitment to process; diagnosis is known to respond to functional treatment;   Negative indicators: none    TREATMENT PLAN  Speech therapy    DURATION/FREQUENCY OF TREATMENT  5 weekly or bi-weekly one-hour sessions, with 1 monthly one-hour follow-up sessions. She will do two of her sessions in the next couple of weeks before flying down to [place].     GOALS  Patient goal:    To understand the problem and fix it as much as possible  To have a normal and acceptable voice quality  To reduce her cough to acceptable levels    Short-term goal(s): Within the first 4 sessions, @M@ [unfilled] will:  1.  will demonstrate  improved awareness of throat clearing / cough: acknowledging >75% of all cough events during session time with no clinician support  will be able to independently list key factors in maintenance of good vocal hygiene with 80% accuracy, and report on their use outside the therapy room.  will demonstrate semi-occluded vocal tract (SOVT) exercises with at least 80% accuracy with no clinician support    Long-term goal(s): In 9 months, Ms. Deshpande will:  1.  Report a week with no more than two episodes of coughing or throat-clearing per day, that do not last more than two seconds  Report resolution of symptoms, and use of optimal voice quality and comfort to meet personal, social, and professional needs, 90% of the time during a typical week of vocal activities    _________________________________________________________________________________    I CERTIFY THE NEED FOR THESE SERVICES FURNISHED UNDER THIS PLAN OF TREATMENT AND WHILE UNDER MY CARE       (Physician attestation of this document indicates review and certification of the therapy plan).     Certification date from: 11/21/2023  Certification date to: 2/19/24    Referring Provider: Dr. Calderon.    [facility]  Arturo Sutton Jr., M.D., F.A.C.S.  May Nance M.D., M.P.H.  Mary Calderon M.D.  Rach Elizabeth M.M. (voice), M.A., CCC-SLP  Ana Alvarado MSIM., CCC-SLP  Imelda Sepulveda, Ph.D., CCC-SLP  Adarsh Garcia, Ph.D., CCC-SLP  Tracee Baptiste M.S., CCC-SLP  Pk Corrigan M.M., M.A., CCC-SLP  SHELTON Odonnell (voice), M.S., CCC-SLP    [facility]  VOICE/SPEECH/BREATHING EVALUATION AND LARYNGEAL EXAMINATION REPORT    Patient: Soraida Deshpande  Date of Visit: 11/21/2023    Clinician: Ana Alvarado M.A., CCC-SLP  Seen in conjunction with: Dr. Mary Calderon  We are also joined by Swallow Specialty SLP Annamarie Carmona M.S., CCC-SLP  Referring Physician: Self  Her  Kenney was present    CHIEF COMPLAINT:  Globus    HISTORY  Soraida Charlee is a 78  "year old female presenting today for evaluation of globus and voice.  She has mucus and hoarseness, but the globus is what bothers her. The globus is the worst and she feels \"full of junk in her throat that she can't get out.\" It is constant but eating makes this better. Her diet does affect this. If she has almond milk, she will feel mucus in her throat. Talking doesn't make a difference. It can be worse at night rather than in the mornings.    She was feeling post nasal drip frequently so she did diet modifications (avoiding citrus, chocolate, no eating before bed, elevating her bed ). She initially thought this was allergies but she was told this was silent reflux.     She made this appointment a long time ago and she feels like the silent reflux causing her trouble. She feels mucus in the throat, she has had significant weight loss (20 pounds), she feels echoing in her ears, a bitter taste in mouth in the mornings, cough and throat clearing, and now a lunch infection. She thought she lost weight due to diet modifications. She is on Famotidine and Gaviscon, but they do not seem to be fixing her reflux. She is concerned that there is \"aerosolized reflux going to into her lungs.\"     She was seeing a pulmonologist in [place] for a bronchoscopy with pseudomonas and was treated. She had cultures with one has showing as lung infection. She and her pulmonary doctor will decide what treatment is once the rest of the cultures \"are figured out.\" She is going to [place] on December 5 and she has a pulmonology appointment on December 6th.     She took a ten day course of antibiotics (Levaquin for pseudomonas) and this helped with her cough.     VOICE  Current Symptoms:  It is \"raspy a little bit\"  It can get worse with extended talking. Talking on the phone for a while can affect this and she has to rest  Her voice is never normal  She can't sing anymore.   Does not feel pain but a sore/dry throat but full of junk  Can be " "effortful to talk       COUGH/THROAT CLEARING  Current Symptoms:  Coughs and clears her throat often, more throat clearing  She feels \"the junk\" in the throat  Talking may make it worse  Doesn't notice throat clearing after eating  Will cough when she just lays down. She now sleeps at an angle  No coughing waking her up.     SWALLOWING  Current Symptoms:  Every once in a while something will go down the wrong pipe   Swallowing is fine though, no concerns    BREATHING  Current Symptoms:   Can take a deep breath and it hurts in her chest  Does a 40 minute walk but may feel \"pooped by the end.\" She needs to sit down after.     THROAT  Current Symptoms  Sore throat with talking    OTHER PERTINENT HISTORY  Reflux; managed with   GERD  Famotidine 40 mg at night  Gaviscon at night  Dr. Negrete recommends looking at her UES. She had endoscopy a year ago from GI doctor.   Discussed doing mamometry and PH probe.   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   Allergies; managed with  Treated for pneumonia May of 2023.   She had a bilateral thyroidectomy in 2007   Hypothyroidism   Sleep apnea; uses CPAP  Snores at night  Please refer to Dr. Calderon's note and chart for additional history    Per Faviola Amato SLP's note on 06/28/2023: Per ENT note on 6/7/2023  Soraida Deshpande is a 78 y.o. female has symptoms of globus, postnasal drip, hoarseness.  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   Patient recently diagnosed with an atypical pneumonia and unintentionally lost 20 pounds.  A video swallow is ordered to rule out aspiration.    Video Recording: Video swallow recorded at [facility], key images sent to PACS and the full exam is stored in the Speech Pathology department.   Observed Swallow in: Lateral view.   Therapist Impression: The patient's oropharyngeal swallow function is WNL; no laryngeal penetration or aspiration " with any of the consistencies. There is adequate and timely epiglottic inversion to protect the airway and no pharyngeal stasis with any of the consistencies. Soraida and her  were educated on the anatomy and physiology of the swallow function and watched the recorded video swallow eval. Recommend a regular texture diet with any liquid; further speech therapy is not indicated.     Per Dr. Whatley's note on 06/07/2023: 12/21/22: Soraida Deshpande is a 78 y.o. female who is here for evaluation of laryngopharyngeal reflux, hoarseness, cough, postnasal drip. She is followed with GI in [place] it was noted to have some gastric inlet on endoscopy. They were concern for LPR D and wanted referral for evaluation of laryngoscopy.   She has been doing dietary modification for her symptoms. She is tried Zyrtec. Zyrtec seems to help mildly. Saline rinses have not helped significantly. Her symptoms that are most bothersome to her are postnasal drip and feeling like something is caught in her throat. She also will feel like her ears are plugged. She has dysphonia and feels like her voice gets hoarse particularly with talking after an hour on the phone with a friend.  6/7/23: Patient comes in today to follow-up symptoms. She continues to have hoarseness and did try speech therapy however did not see much benefit in this. She was diagnosed with an atypical pneumonia and wonders about follow-up scan related to that. She did get an esophagram but did not get a modified barium swallow study in her primary had mentioned a modified barium swallow study to make sure she is not aspirating. She does not feel like she is aspirating but will wake up frequently at night. She is not know if this is due to the reflux or if she has sleep apnea. Her  is concerned that she does have pauses and gasping at night. She is not had a recent sleep study. She does not tolerate Flonase for the postnasal drip. She has tried as last seen for the last  2 weeks but is unsure if it is helpful. She does get a drippy nose that can happen at any time. She is not tried ipratropium. She does use Zyrtec. She has had about a 20 lb weight loss that her primary is looking into. She does feel more ear symptoms lately and can hear her breath in her ear as well as hears an echo.   Findings from flexible endoscopy: Larynx: thinned vocal fold bilaterally with compensatory lateral compression of the false vocal fold during phonation L>R and Subglottis: small (approx 1mm) white circular nodule in the left subglottis (appears benign an unchanged)     Per Dr. Hightower's note on 06/18/202: Soraida Deshpande typically spends 5 months of the year in [place] and the other 7 months in [place].  Approximately 18 months ago she saw her [place] physician for evaluation of upper abdominal pain.  This was diagnosed as gastritis, and her symptoms improved after taking 4 weeks of omeprazole.  She she felt the omeprazole caused constipation.  An upper GI x-ray reportedly showed reflux into the lower esophagus, with no other lesions.   In January she took Actonel for osteopenia.  Shortly after this she was seen in the ER for chest discomfort which was ultimately attributed to Actonel-related gastroesophageal reflux.  She had recurrent epigastric burning discomfort and nausea in February of this year.  Prior to this she had been taking ibuprofen, 800 mg 2-3 times per week.  She stopped the ibuprofen, along with coffee, alcohol, chocolate, and spicy foods.  She started taking Nexium 20 mg per day, and continued this for 6 weeks.  The Nexium was discontinued 3 weeks ago.  The abdominal pain has now resolved.  She has occasional trace nausea.  Her weight is down 5 lb with this episode, now 121 lbs.  She attributes the weight loss to diet changes.  She denies anorexia, dysphagia, heartburn, constipation, diarrhea, melena, or red blood per rectum.  Medications:  Recent use of ibuprofen, now stopped.   Recent Nexium 20 mg, now stopped.  Additional items (cholesterol and thyroid medications?) are not listed.  Habits:  No smoking.  Alcohol, 5-7 glasses of wine per week.  Social:  The patient is .  She travels to [place] in the winter.  Family history:  Mother had acid peptic disease.  There is no family history of stomach cancer or colon cancer.  In summary: Soraida Deshpande had epigastric burning discomfort that responded to stopping ibuprofen, avoiding coffee, alcohol, chocolate and spicy foods, and taking Nexium for 6 weeks.  Acid peptic disease was suspected.  She feels well today and is not interested in further evaluation at this time.  I asked her to stay off ibuprofen in the future.  She will follow up in GI clinic as needed.  If the epigastric discomfort returns we will consider checking stool for Helicobacter pylori and/or scheduling an endoscopy.    OBJECTIVE FINDINGS  VOICE/ SPEECH/ NON-COMMUNICATIVE LARYNGEAL BEHAVIORS EVALUATION  An evaluation of the voice and upper airway was accomplished today; salient features are as follows:  Palpation of the laryngeal area shows:  Tenderness of the thyrohyoid area   Massage of the thyrohyoid area is annoying.   Cough/ Throat clear:  Occasional and dry with swallow portion of the evaluation  Breathing pattern:  Shallow  Phonation is not coordinated with respiration  Tension is evident:  Face  Voice quality is characterized by  Variable mild-moderate roughness  Intermittently mild-moderate strain  Mild breathiness  Habitual pitch was not formally tested, but is judged to be WNL and appropriate  Loudness is WNL and appropriate for the setting  A singing task shows some improvement in voice quality.  GLOBAL ASSESSMENT OF DYSPHONIA:  30/100      LARYNGEAL EXAMINATION    Endoscopic laryngeal examination was performed by:  Dr. Calderon  I provided the protocol of instructions for the patient.    Type of exam:   Flexible endoscopy with chip-tip technology and stroboscopy;  nasal decongestant Afrin was applied to both nares.    This exam shows:    Laryngeal and Vocal Fold Mucosa  Mild erythema of the arytenoids and medial arytenoid walls   Mild posterior commissure hypertrophy  Moderate presence of sticky and bubbly secretions on the vocal folds and throughout the laryngeal area  Sticky round mucus at the right false fold that was cleared out of the airway with throat clear.  Status of vocal fold mucosa:   Bowing of the right vocal fold. Left vocal fold could not be fully seen due to left ventricular fold obscuring view.   Left ventricular fold appear bigger and puffy. It juts out toward midline and obscures the left vocal fold.    Otherwise within normal limits, with no lesions and straight vibratory margins  Narrow Band Imaging yielded the following: Erythema bilaterally on the vocal folds R>L    Neurological and Functional Integrity of the Larynx  Vocal folds are mobile and meet at midline; movement is brisk and symmetric; exam is neurologically normal   Airway is adequate but there appears to be round area (possibly mucus) on the left side, but this is unclear  Elongation of the vocal folds for pitch increase appears WNL  The vocal folds often demonstrates overlapping scissoring action. The right can appear higher than the left intermittently, and the left can appear higher than the right intermittently.  Unclear if this is due to angle of camera.     Supraglottic Function and Therapy Probes  Bilateral ventricular approximation during phonation with increased compression of the left ventricular fold.   Left ventricular fold appears to dampen vibration of the true vocal folds and can impair vibration intermittently  Marked-severe four-way constriction of the supraglottic larynx during connected speech with more medio-lateral compression  Supraglottic function during singing is improved  Therapy probes show   Reduction in supraglottic hyperfunction during tasks that involved  word-initial voiceless aspirates and labiodental fricatives forward resonance maneuvers    Stroboscopy provided the following additional information, however, left ventricular fold obscured the  anterior and mid portion of the left vocal fold consistently during phonation  Stroboscopy shows:   RTVF Amplitude: The right vocal fold appears stiff at higher pitches with moderate reduction. At low and middle pitches, amplitude appears mildly reduced.   LTVF Amplitude: Appears WNL  RTVF mucosal wave: WNL  LTVF mucosal wave: Appears WNL  Symmetry: Frequent asymmetry.   On phonation, glottic closure is incomplete with frequent spindle-shaped configuration of the glottis during  phonation and more pronounced at higher pitches; open phase predominant      Dr. Calderon and I reviewed this laryngeal exam with Ms. Deshpande today, and I provided pertinent explanations:  Endoscopic findings are consistent with audio-perceptual assessment and patient Hx/complaints.  her symptoms are accounted for by left ventricular fold appears to dampen vibration of the true vocal folds and can impair vibration intermittently   Because there appears to be a functional component to her symptoms, she would most likely benefit from functional speech therapy.    ASSESSMENT / PLAN  IMPRESSIONS:  This evaluation has resulted in the following diagnosis/diagnoses for Ms. Deshpande  Dysphonia (R49.0)  Laryngeal Hyperfunction (J38.7)  Irritable Larynx Syndrome (ILS) (J38.7)  Chronic Throat Clearing (R68.89)  Globus Sensation (R09.89).    Laryngeal evaluation demonstrated bowing of the right vocal fold and left vocal fold could not be fully seen due to left ventricular fold obscuring view; left ventricular fold appear bigger and puffy, and it juts out toward midline and obscures the left vocal fold; otherwise within normal limits, with no lesions and straight vibratory margins; Narrow Band Imaging yielded the following: Erythema bilaterally on the vocal folds R>L;  mild erythema of the arytenoids and medial arytenoid walls; mild posterior commissure hypertrophy; moderate presence of sticky and bubbly secretions on the vocal folds and throughout the laryngeal area; sticky round mucus at the right false fold that was cleared out of the airway with throat clear; airway is adequate but there appears to be round area (possibly mucus) on the left side, but this is unclear; the vocal folds often demonstrates overlapping scissoring action; the right can appear higher than the left intermittently, and the left can appear higher than the right intermittently but unclear if this is due to angle of camera; bilateral ventricular approximation during phonation with increased compression of the left ventricular fold; left ventricular fold appears to dampen vibration of the true vocal folds and can impair vibration intermittently; marked-severe four-way constriction of the supraglottic larynx during connected speech with more medio-lateral compression; the right vocal fold appears stiff at higher pitches with moderate reduction and at low and middle pitches, amplitude appears mildly reduced; Frequent asymmetry; On phonation, glottic closure is incomplete with frequent spindle-shaped configuration of the glottis during  phonation and more pronounced at higher pitches; open phase predominant  Perceptual evaluation demonstrated variable mild-moderate roughness; intermittently mild-moderate strain; mild breathiness    STIMULABILITY: results of therapy probes during perceptual and laryngeal evaluation demonstrate improvement with flow phonation probes.   RECOMMENDATIONS:   A course of speech therapy is recommended to improve voice quality and promote reduced discomfort, effort and fatigue.  She demonstrates a Good prognosis for improvement given adherence to therapeutic recommendations. Therapeutic   Positive indicators: commitment to process; diagnosis is known to respond to functional treatment;    Negative indicators: none    TREATMENT PLAN  Speech therapy    DURATION/FREQUENCY OF TREATMENT  5 weekly or bi-weekly one-hour sessions, with 1 monthly one-hour follow-up sessions. She will do two of her sessions in the next couple of weeks before flying down to [place].     GOALS  Patient goal:    To understand the problem and fix it as much as possible  To have a normal and acceptable voice quality  To reduce her cough to acceptable levels    Short-term goal(s): Within the first 4 sessions, @M@ [unfilled] will:  1.  will demonstrate improved awareness of throat clearing / cough: acknowledging >75% of all cough events during session time with no clinician support  will be able to independently list key factors in maintenance of good vocal hygiene with 80% accuracy, and report on their use outside the therapy room.  will demonstrate semi-occluded vocal tract (SOVT) exercises with at least 80% accuracy with no clinician support    Long-term goal(s): In 9 months, Ms. Deshpande will:  1.  Report a week with no more than two episodes of coughing or throat-clearing per day, that do not last more than two seconds  Report resolution of symptoms, and use of optimal voice quality and comfort to meet personal, social, and professional needs, 90% of the time during a typical week of vocal activities    This treatment plan was developed with the patient who agreed with the recommendations.    TOTAL SERVICE TIME: 60 minutes  EVALUATION OF VOICE AND RESONANCE (02327)  NO CHARGE FACILITY FEE (46581)      Ana Avlarado M.A., CCC-SLP  Speech-Language Pathologist  [facility]  [email or web address]  She/Her/Hers       Speech recognition software may have been used in this documentation; input is reviewed before signature to the best of my ability.

## 2023-11-29 ENCOUNTER — VIRTUAL VISIT (OUTPATIENT)
Dept: OTOLARYNGOLOGY | Facility: CLINIC | Age: 78
End: 2023-11-29
Payer: COMMERCIAL

## 2023-11-29 DIAGNOSIS — J38.7 LARYNGEAL HYPERFUNCTION: ICD-10-CM

## 2023-11-29 DIAGNOSIS — R49.0 DYSPHONIA: ICD-10-CM

## 2023-11-29 DIAGNOSIS — J38.7 IRRITABLE LARYNX SYNDROME: ICD-10-CM

## 2023-11-29 DIAGNOSIS — R09.A2 GLOBUS SENSATION: ICD-10-CM

## 2023-11-29 DIAGNOSIS — R09.89 CHRONIC THROAT CLEARING: Primary | ICD-10-CM

## 2023-11-29 PROCEDURE — 92507 TX SP LANG VOICE COMM INDIV: CPT | Mod: GN | Performed by: SPEECH-LANGUAGE PATHOLOGIST

## 2023-11-29 NOTE — PROGRESS NOTES
Soraida Deshpande is a 78 year old female who is being evaluated via a billable video visit.      Soraida has been notified and verbally consented to the following:   This video visit will be conducted between you and your provider.  Patient has opted to conduct today's video visit vs an in-person appointment.   Video visits are billed at different rates depending on your insurance coverage. Please reach out to your insurance provider with any questions.   If during the course of the call the provider feels the appointment is not appropriate, you will not be charged for this service.  Provider has received verbal consent for billable virtual visit from the patient? Yes  Will anyone else be joining your video visit? No    Call initiated at: 11:00 am   Type of Visit Platform Used: [employer]  Location of provider: [facility]  Location of patient: [facility]  Arturo Sutton Jr., M.D., F.A.C.S.  May Nance M.D., M.P.H.  Mary Calderon M.D.  Rach Elizabeth M.M. (voice), M.A., CCC-SLP  Ana Alvarado M.A., CCC-SLP  Imelda Sepulveda, Ph.D., CCC-SLP  Adarsh Garcia, Ph.D., CCC-SLP  BRYAN Elias.S., CCC-SLP  Pk Corrigan M.M., M.A., CCC-SLP  SHELTON Odonnell (voice), M.S., [facility]  VOICE/SPEECH/BREATHING THERAPY PROGRESS REPORT    Patient: Soraida Deshpande  Date of Service: 11/29/2023  2/10 Sessions    Date of Last Service: 11/21/2023  Referring physician: Dr. Calderon  Impressions from initial evaluation on 11/21/2023: This evaluation has resulted in the following diagnosis/diagnoses for Ms. Deshpande  Dysphonia (R49.0)  Laryngeal Hyperfunction (J38.7)  Irritable Larynx Syndrome (ILS) (J38.7)  Chronic Throat Clearing (R68.89)  Globus Sensation (R09.89).    Laryngeal evaluation demonstrated bowing of the right vocal fold and left vocal fold could not be fully seen due to left ventricular fold obscuring view; left ventricular fold appear bigger and puffy, and it juts  out toward midline and obscures the left vocal fold; otherwise within normal limits, with no lesions and straight vibratory margins; Narrow Band Imaging yielded the following: Erythema bilaterally on the vocal folds R>L; mild erythema of the arytenoids and medial arytenoid walls; mild posterior commissure hypertrophy; moderate presence of sticky and bubbly secretions on the vocal folds and throughout the laryngeal area; sticky round mucus at the right false fold that was cleared out of the airway with throat clear; airway is adequate but there appears to be round area (possibly mucus) on the left side, but this is unclear; the vocal folds often demonstrates overlapping scissoring action; the right can appear higher than the left intermittently, and the left can appear higher than the right intermittently but unclear if this is due to angle of camera; bilateral ventricular approximation during phonation with increased compression of the left ventricular fold; left ventricular fold appears to dampen vibration of the true vocal folds and can impair vibration intermittently; marked-severe four-way constriction of the supraglottic larynx during connected speech with more medio-lateral compression; the right vocal fold appears stiff at higher pitches with moderate reduction and at low and middle pitches, amplitude appears mildly reduced; Frequent asymmetry; On phonation, glottic closure is incomplete with frequent spindle-shaped configuration of the glottis during  phonation and more pronounced at higher pitches; open phase predominant  Perceptual evaluation demonstrated variable mild-moderate roughness; intermittently mild-moderate strain; mild breathiness       I had the pleasure of seeing Ms. Deshpande today, for speech therapy to address a diagnosis of:  Dysphonia (R49.0)  Laryngeal Hyperfunction (J38.7)  Irritable Larynx Syndrome (ILS) (J38.7)  Chronic Throat Clearing (R68.89)  Globus Sensation (R09.89).      PROGRESS  SINCE LAST SESSION  At the last session, Ms. Deshpande worked on therapeutic activities to address the above diagnosis.    Ms. Deshpande was seen for evaluation on 11/21/2023.  At that time, it was determined that  she would benefit from a course of speech therapy to address the above diagnosis.  Since then, she states she has not had any change in her symptoms, which is expected, as no therapeutic suggestions were made at the time.    Ms. Deshpande also states that:  She will be referred to respiratory therapy to get stuff out due to her specific lung infection (non tuberculin microbacteria).  She is susceptible to bacteria so leaving the shower running may not be doable. She feels that she can steam with boiling water.   She was using the sinus rinse once a day ,but didn't notice a difference. She cannot try the steroid sprays because it makes her itch.   She is on Mucinex  12 hour expectorant version to help loosen the mucus in her lungs.   She is taking one in the morning and one at night (her pulmonary and PCP doctors told her to take it).   She feels like it is not helping.     Ms. Deshpande presents today with the following:  Voice quality:  Intermittent moderate roughness  Intermittent mild strain  Cough/ Throat clear:  Occasional cough when trying the gargling.     THERAPEUTIC ACTIVITIES  Today Ms. eDshpande participated in the following therapeutic activities:  Asked many questions about the nature of her symptoms, and I answered all of these thoroughly.  [place] concepts and techniques for using saline and plain-water gargling nasal irrigation steam guaifenesin to reduce the thickened secretions / laryngeal irritation.  [place] concepts and techniques for improving topical and systemic hydration   [place] concepts and strategies managing laryngopharyngeal reflux disorder, to reduce laryngeal inflammation.  I provided instruction for techniques and strategies to reduce the chronic cough/throat clearing  alternative behaviors  "such as voiceless glottic coup, humming, swallowing, etc. were taught  [place] techniques for optimal breathing at rest and with speech.   Practiced with crossed arms and her hands on her ribcage.   [place] exercises to add phonation to the optimal flowing airstream.  Semi-occluded vocal tract exercises with a straw (\"cup and bubbles\"), straw phonation, lip trills, Buzzy Z, trumpet, and humming (smile and yawn posture) instructions were provided.  Instructed to use as a voice warm up, cool down, coordination of breath flow with phonation, as a voice reset frequently throughout the day  good learning, but will need practice   good learning, but will need practice  [place] concepts of an optimal regimen for practice.  she should use an interval schedule of practice, with brief periods of practice frequently throughout each day  I provided an after visit summary to help facilitate practice.    IMPRESSIONS/GOALS/PLAN  Ms. Deshpande had a productive session of speech therapy today, to address the following:  Dysphonia (R49.0)  Laryngeal Hyperfunction (J38.7)  Irritable Larynx Syndrome (ILS) (J38.7)  Chronic Throat Clearing (R68.89)  Globus Sensation (R09.89).      Speech therapy for her is medically necessary to allow  her to meet personal and professional demands and fully engage in activities of daily living.     She will continue to work on her exercises on a daily basis, and work on incorporating the techniques into her daily activities.    Progress toward long-term goals: (Reporting period: 11/21/2023  to 02/19/2024)  Minimal at this point, as this is first session, but good learning today    Goals for this practice period:   practice all exercises according to instructions  incorporate techniques into daily vocal activities    Plan: Ms. Deshpande will see Dr. Sepulveda for another therapy session on 12/04/2023 to promote better learning before leaving for [place].     Reporting period 11/21/2023 - 02/`19/2024      TOTAL " SERVICE TIME: 60 minutes  TREATMENT (56026)  NO CHARGE FACILITY FEE    Ana Alvarado M.A., CCC-SLP  Speech-Language Pathologist  [facility]  [email or web address]  She/Her/Hers     Speech recognition software may have been used in this documentation; input is reviewed before signature to the best of my ability.

## 2023-11-29 NOTE — PATIENT INSTRUCTIONS
Nathaniel Quigley,    Here is everything we discussed. Please reach out if you need anything.    Ana Alvarado M.A., CCC-SLP  Speech-Language Pathologist  [facility]  [email or web address]  She/Her/Hers         Irritable Larynx Syndrome    What is Irritable Larynx Syndrome?  Irritable Larynx Syndrome (ILS) is a cluster of symptoms not associated with a specific disease process. Individuals with ILS can have any combination of the following complaints:      Globus sensation (feeling of lump or some other sensation in the throat)  Throat irritation or burning sensation  Tightness of throat or neck  Chronic cough or throat clearing; sensation of need to clear throat  Effort or pain with swallowing  Paradoxical vocal fold motion (sensation of difficulty inhaling)  Laryngospasm (tightening of throat causing choking or difficulty inhaling)    What causes ILS?  ILS works in the same way as a mosquito bite: We might scratch the bite absentmindedly a couple of times, and suddenly we realize the bite really itches!  So we scratch it vigorously because that feels better for a few moments. In the long run though, scratching actually makes the itch worse.  In the same way, when individuals experience mild irritation in their throats for some reason, they might not realize that they've begun  scratching  the  itch,  (throat clearing) but over time the irritation worsens and becomes more noticeable.  This is how earlier, milder symptoms can be the precursors to more-severe symptoms. Chronic irritation of the mucosa in the laryngeal area can cause changes to the nerve pathways; they can become hyper-excitable, so that it only takes a small irritation to have a large sensory response (like a cough).  It's nice that the nervous system can learn, but in this case it has backfired!    Here are some possible irritants that can start the chain reaction (most individuals have more than one):  Upper respiratory infection with  cough  Acid Reflux  Post Nasal Drainage  Allergens (e.g. tree, mold, pollen, pet dander)  Cigarette smoke or other kinds of smoke  Odors (e.g. perfume, hairspray)  Food sensitivities  Strong Emotions (e.g. anxiety, stress)  Hyperfunction of the muscles of the vocal mechanism  Harsh chemicals/  Cold Air    Evaluation of ILS  At the [facility], an otolaryngologist and a speech-language pathologist work as a team to determine if ILS is a problem.  Medical evaluation and laryngeal examination rule out any other cause for the symptoms.  Some medical treatment may be advised.  Functional evaluation determines whether there are behavioral or lifestyle factors that are contributing to the symptoms.      Treatment of ILS  Treatment of ILS addresses the cause of irritation. This can include:   Treatment of Acid Reflux This may include: Medications (what your MD prescribes), Dietary Precautions (what you eat and what supplements you take), Lifestyle Precautions (when you eat, avoiding environmental irritants), and Mechanical Precautions (how much pressure you put on your lower esophageal sphincter).  Functional Speech Therapy with a Speech-Language Pathologist (SLP). Your SLP will educate you about the disorder, help you improve the environment of your mucosa to reduce irritation, improve the movement and function of your larynx, and help reduce muscle tension and restore muscle balance.  Further Medical treatment is sometimes used to restore the medical basis for normal function and sensation.  Your MD will discuss these possibilities with you if they are relevant.      Cough and Throat Clearing  The biological purpose of the larynx is to protect the airway (trachea and lungs). Coughing and throat clearing are mechanisms that are meant to assist in the protection of our airway. When we feel something such as liquid, food, saliva, dust, etc. near our vocal folds, we will clear our throats and cough as a  protective reflex. We also cough or throat clear if our airway is irritated.     Why can chronic coughing and throat clearing be harmful?  A cough is produced by squeezing the vocal folds together, building up pressure in the lungs, and then quickly forcing the vocal folds open to clear away whatever might be getting too close to our airway. This can be traumatic to the vocal folds, irritating them in the same way that our hands would be irritated if they were being slapped together over and over. Coughing for a long period of time can cause the mucosa of the larynx and vocal folds to become hypersensitive, making it feel like something is threatening the airway.  The vocal folds need to cough or throat clear even when there isn't an actual physical threat to the airway.  The chronic coughing or throat clearing results in even more coughing or throat clearing.      Strategies to Reduce Irritation  Your speech-language pathologist will teach you techniques to use muscles optimally, in order to reduce irritation.  In the meantime, you can start reducing the irritation, using the following strategies:    PREVENTATIVE WAYS TO REDUCE COUGH AND THROAT CLEARING:  Identifying your unique trigger; take steps to avoid it  Improve both systemic and topical (surface) hydration (dry mucosa is more easily irritated)  Drink adequate fluids   Steaming  We decided that boiling hot water (distilled) and breathe in the steam is best  Or you can inhale steam from a hot tea that you are drinking  Guaifenesin (e.g. Mucinex) to thin viscous secretions  Sucking on candy, or cough drops without menthol (Pectin ingredient instead), or chew gum, to increase salivary stimulation  Stimulate oral cavity by eating fruits such as grapes, watermelon, apples, cantaloupe, etc.,     WHAT TO DO WHEN YOU FEEL THE TICKLE OR URGE TO COUGH:  Alternative Behaviors to coughing  Swallow hard  Stimulate your oral cavity:   Sip hot or cold water  Suck on ice  "chips  Bite your tongue or cheek (not too hard!)  Massage under your chin or neck  Gentle hums or vocal exercises taught to you by your SLP: you should feel a forward sensation at your lips, tongue, teeth, etc.   Say  eh eh eh eh  silently (for a gentle, non-irritating throat-clear)  Gargle with regular water, warm water, or warm water with saline solution. You can gargle any time you feel irritation, after meals, or after brushing your teeth.  Take a small sip and tip head back to gargle for less than 5 seconds  Lean your head side to side while gargling. Keep it short and only use a small sip, not a big gulp. Bring head back to neutral position to swallow.   If you want to try, tip head back and say \"ka ka ka\" with a small sip and it should be less than 5 seconds. Bring head back to neutral position to swallow.     WHAT TO DO WHEN YOU CAN'T STOP COUGHING:   Inhalation   Do 3 quick puppy sniffs in through the nose  Inhale slowly through the nose like you are smelling cookies  Inhale slowly through rounded lips like you are silently slurping a spaghetti noodle or a thin straw  Exhalation  Exhale on a \"Sh\" like you are shushing bad kids  Exhale like you are blowing candles on a cake.          BELLY BREATHING (3 breaths per hour, until habit) Set an alarm on your phone to remind you to breathe.   Cross your arms and place your hands on either side of your low ribs. Or you can place your hands on the sides of your ribcage like you are wearing a corset.   Inhale with rounded lips (silently slurping spaghetti noodle) and exhale like you are blowing out candles  Slowly breathe in and feel your belly and ribcage expand, like filling a balloon, pushing out against your waistband.  Slowly breathe out and feel your belly and ribcage crunch inward, like zipping tight pants  Repeat slowly and take breaks if you get dizzy  You can count with the inhalation and exhalation (4 counts in, 4 counts out). If you want to challenge " "yourself, you can count 6-8 counts in and 6-8 counts out.   HELPFUL HINTS:  -- Some people find it easiest to practice while laying on their back or lean back in chair with one hand on their belly and one hand on their chest.   --Look into breathwork or meditations with an emphasis in breathwork.     SEMI-OCCLUDED VOCAL TRACT EXERCISES: 3-5x a day for 3-5 minutes.    You can do Buzzy Z (like a bee), humming, or you can also use the straw alone. If you want to try the cup and bubbles, use only two inches of water and make sure your straw is not touching the bottom of the cup. Use a fat straw as well.     Cup and Bubble Exercises   WHY: Though these exercises seems (and feels) silly they are helpful for a number of reasons. First, they make you use your air generously and consistently, helping you to coordinate your breath and your voice. Second, they lengthen and narrow the vocal tract with the straw. This narrowing (or semi-occlusion in scientific terms) creates back pressure in your throat which has been shown to help the vocal folds vibrate more easily and reduce how hard some of the other muscles are squeezing.   HOW:   With Water - Fill the cup (or bottle) about 2 inches full of water. Blow bubbles through the straw while keeping your voice on. (kind of like making an \"ooooo\" sound through straw).Keep the water bubbling the whole time. That means your air is moving consistently.   Without Water - make sound through the straw (like it's a kazoo). Make sure you are using your air freely. You can hold your hand in front of the straw to test, and you should be able to feel the air moving.   Feel how open and relaxed your throat feels when you practice these sounds. If the bubbling or air stops or it gets tight, don't sweat it. Just breath, relax, and start again. Across all the exercises make sure you are getting a nice low breath and feeling the steady inward motion of low abdominal muscles when making sound. "   Practice 5 times a day for no more than 3 minutes, and whenever you feel fatigued.   Aren't sure if you are doing it right? Ask yourself these three questions:   1. Does it feel easy?   2. Are the bubbles and voice consistent?   3. Do you feel that forward buzz?     What sounds to make:   Single pitches - use any comfortable pitch and sustain the note for as long as it feels free and easy, and your lips or tongue are bubbling.   Sighs - glide from high to low like you are sitting down in a comfortable chair after a long day of work   [place] - Start on a medium pitch and glide up just a bit and back down   Singing - Take your time singing a song slowly like you are in slow motion where one pitch glides into the next pitch. Breathe when you feel like you are running out of air. Do not push through to finish a phrase/song.

## 2023-11-29 NOTE — LETTER
11/29/2023       RE: Soraida Deshpande  [address] [facility] [address]     Dear Colleague,    Thank you for referring your patient, Soraida Deshpande, to the [facility] VOICE CLINIC [facility]. Please see a copy of my visit note below.    Soraida Deshpande is a 78 year old female who is being evaluated via a billable video visit.      Soraida has been notified and verbally consented to the following:   This video visit will be conducted between you and your provider.  Patient has opted to conduct today's video visit vs an in-person appointment.   Video visits are billed at different rates depending on your insurance coverage. Please reach out to your insurance provider with any questions.   If during the course of the call the provider feels the appointment is not appropriate, you will not be charged for this service.  Provider has received verbal consent for billable virtual visit from the patient? Yes  Will anyone else be joining your video visit? No    Call initiated at: 11:00 am   Type of Visit Platform Used: [employer] Video  Location of provider: [facility]  Location of patient: [facility]  Arturo Sutton Jr., M.D., F.A.C.S.  May Nance M.D., M.P.H.  Mary Calderon M.D.  Rach Elizabeth M.M. (voice), M.A., CCC-SLP  NAN CostaA., CCC-SLP  Imelda Sepulveda, Ph.D., CCC-SLP  Adarsh Garcia, Ph.D., CCC-SLP  Tracee Baptiste M.S., CCC-SLP  Pk Corrigan M.M., M.A., CCC-SLP  SHELTON Odonnell (voice), M.S., CCC-[facility]  VOICE/SPEECH/BREATHING THERAPY PROGRESS REPORT    Patient: Soraida Deshpande  Date of Service: 11/29/2023  2/10 Sessions    Date of Last Service: 11/21/2023  Referring physician: Dr. Calderon  Impressions from initial evaluation on 11/21/2023: This evaluation has resulted in the following diagnosis/diagnoses for Ms. Charlee  Dysphonia (R49.0)  Laryngeal Hyperfunction  (J38.7)  Irritable Larynx Syndrome (ILS) (J38.7)  Chronic Throat Clearing (R68.89)  Globus Sensation (R09.89).    Laryngeal evaluation demonstrated bowing of the right vocal fold and left vocal fold could not be fully seen due to left ventricular fold obscuring view; left ventricular fold appear bigger and puffy, and it juts out toward midline and obscures the left vocal fold; otherwise within normal limits, with no lesions and straight vibratory margins; Narrow Band Imaging yielded the following: Erythema bilaterally on the vocal folds R>L; mild erythema of the arytenoids and medial arytenoid walls; mild posterior commissure hypertrophy; moderate presence of sticky and bubbly secretions on the vocal folds and throughout the laryngeal area; sticky round mucus at the right false fold that was cleared out of the airway with throat clear; airway is adequate but there appears to be round area (possibly mucus) on the left side, but this is unclear; the vocal folds often demonstrates overlapping scissoring action; the right can appear higher than the left intermittently, and the left can appear higher than the right intermittently but unclear if this is due to angle of camera; bilateral ventricular approximation during phonation with increased compression of the left ventricular fold; left ventricular fold appears to dampen vibration of the true vocal folds and can impair vibration intermittently; marked-severe four-way constriction of the supraglottic larynx during connected speech with more medio-lateral compression; the right vocal fold appears stiff at higher pitches with moderate reduction and at low and middle pitches, amplitude appears mildly reduced; Frequent asymmetry; On phonation, glottic closure is incomplete with frequent spindle-shaped configuration of the glottis during  phonation and more pronounced at higher pitches; open phase predominant  Perceptual evaluation demonstrated variable mild-moderate  roughness; intermittently mild-moderate strain; mild breathiness       I had the pleasure of seeing Ms. Deshpande today, for speech therapy to address a diagnosis of:  Dysphonia (R49.0)  Laryngeal Hyperfunction (J38.7)  Irritable Larynx Syndrome (ILS) (J38.7)  Chronic Throat Clearing (R68.89)  Globus Sensation (R09.89).      PROGRESS SINCE LAST SESSION  At the last session, Ms. Deshpande worked on therapeutic activities to address the above diagnosis.    Ms. Deshpande was seen for evaluation on 11/21/2023.  At that time, it was determined that  she would benefit from a course of speech therapy to address the above diagnosis.  Since then, she states she has not had any change in her symptoms, which is expected, as no therapeutic suggestions were made at the time.    Ms. Deshpande also states that:  She will be referred to respiratory therapy to get stuff out due to her specific lung infection (non tuberculin microbacteria).  She is susceptible to bacteria so leaving the shower running may not be doable. She feels that she can steam with boiling water.   She was using the sinus rinse once a day ,but didn't notice a difference. She cannot try the steroid sprays because it makes her itch.   She is on Mucinex  12 hour expectorant version to help loosen the mucus in her lungs.   She is taking one in the morning and one at night (her pulmonary and PCP doctors told her to take it).   She feels like it is not helping.     Ms. Deshpande presents today with the following:  Voice quality:  Intermittent moderate roughness  Intermittent mild strain  Cough/ Throat clear:  Occasional cough when trying the gargling.     THERAPEUTIC ACTIVITIES  Today Ms. Deshpande participated in the following therapeutic activities:  Asked many questions about the nature of her symptoms, and I answered all of these thoroughly.  [place] concepts and techniques for using saline and plain-water gargling nasal irrigation steam guaifenesin to reduce the thickened secretions /  "laryngeal irritation.  [place] concepts and techniques for improving topical and systemic hydration   [place] concepts and strategies managing laryngopharyngeal reflux disorder, to reduce laryngeal inflammation.  I provided instruction for techniques and strategies to reduce the chronic cough/throat clearing  alternative behaviors such as voiceless glottic coup, humming, swallowing, etc. were taught  [place] techniques for optimal breathing at rest and with speech.   Practiced with crossed arms and her hands on her ribcage.   [place] exercises to add phonation to the optimal flowing airstream.  Semi-occluded vocal tract exercises with a straw (\"cup and bubbles\"), straw phonation, lip trills, Buzzy Z, trumpet, and humming (smile and yawn posture) instructions were provided.  Instructed to use as a voice warm up, cool down, coordination of breath flow with phonation, as a voice reset frequently throughout the day  good learning, but will need practice   good learning, but will need practice  [place] concepts of an optimal regimen for practice.  she should use an interval schedule of practice, with brief periods of practice frequently throughout each day  I provided an after visit summary to help facilitate practice.    IMPRESSIONS/GOALS/PLAN  Ms. Deshpande had a productive session of speech therapy today, to address the following:  Dysphonia (R49.0)  Laryngeal Hyperfunction (J38.7)  Irritable Larynx Syndrome (ILS) (J38.7)  Chronic Throat Clearing (R68.89)  Globus Sensation (R09.89).      Speech therapy for her is medically necessary to allow  her to meet personal and professional demands and fully engage in activities of daily living.     She will continue to work on her exercises on a daily basis, and work on incorporating the techniques into her daily activities.    Progress toward long-term goals: (Reporting period: 11/21/2023  to 02/19/2024)  Minimal at this point, as this is first session, but good learning " today    Goals for this practice period:   practice all exercises according to instructions  incorporate techniques into daily vocal activities    Plan: Ms. Deshpande will see Dr. Sepulveda for another therapy session on 12/04/2023 to promote better learning before leaving for [place].     Reporting period 11/21/2023 - 02/`19/2024      TOTAL SERVICE TIME: 60 minutes  TREATMENT (21444)  NO CHARGE FACILITY FEE    Ana Alvarado M.A., CCC-SLP  Speech-Language Pathologist  [facility]  [email or web address]  She/Her/Hers     Speech recognition software may have been used in this documentation; input is reviewed before signature to the best of my ability.       Again, thank you for allowing me to participate in the care of your patient.      Sincerely,    Ana Alvarado, SLP

## 2023-12-04 ENCOUNTER — VIRTUAL VISIT (OUTPATIENT)
Dept: OTOLARYNGOLOGY | Facility: CLINIC | Age: 78
End: 2023-12-04
Payer: COMMERCIAL

## 2023-12-04 DIAGNOSIS — J38.7 LARYNGEAL HYPERFUNCTION: ICD-10-CM

## 2023-12-04 DIAGNOSIS — R49.0 DYSPHONIA: Primary | ICD-10-CM

## 2023-12-04 DIAGNOSIS — R09.A2 GLOBUS SENSATION: ICD-10-CM

## 2023-12-04 PROCEDURE — 92507 TX SP LANG VOICE COMM INDIV: CPT | Mod: GN | Performed by: SPEECH-LANGUAGE PATHOLOGIST

## 2023-12-04 NOTE — PROGRESS NOTES
Soraida Deshpande is a 78 year old female who is being evaluated via a billable video visit.      Soraida has been notified and verbally consented to the following:     This video visit will be conducted between you and your provider.    Patient has opted to conduct today's video visit vs an in-person appointment.     Video visits are billed at different rates depending on your insurance coverage. Please reach out to your insurance provider with any questions.     If during the course of the call the provider feels the appointment is not appropriate, you will not be charged for this service.  Provider has received verbal consent for billable virtual visit from the patient? Yes  Will anyone else be joining your video visit? No    Call initiated at: 1:30 PM   Type of Visit Platform Used: [employer] Video   Location of provider: Home  Location of patient: [facility]  Arturo Sutton Jr., M.D., F.A.C.S.  May Nance M.D., M.P.H.  Mary Calderon M.D.  Rach Elizabeth M.M. (voice), M.A., CCC-SLP  Ana Alvarado M.S., CCC-SLP  Imelda Sepulveda, Ph.D., CCC-SLP  Adarsh Garcia, Ph.D., CCC-SLP  Tracee Baptiste M.S., CCC-SLP  Pk Corrigan M.M., M.A., CCC-SLP  SHELTON Odonnell (voice), M.S., CCC-SLP    [facility]  VOICE/SPEECH/BREATHING THERAPY PROGRESS REPORT    Patient: Soraida Deshpande  Date of Service: 12/4/2023    Date of Last Service: 11/29/23  Referring physician: Dr. Calderon  Initial evaluation: 11/21/23  Therapy Session #2     I had the pleasure of seeing Ms. Deshpande today, for speech therapy to address a diagnosis of:  Dysphonia (R49.0)   Laryngeal Hyperfunction (J38.7)  Globus Sensation (R09.89).    PROGRESS SINCE LAST SESSION  At the last session, Ms. Deshpande worked on therapeutic activities to address the above diagnosis.    Regarding practice, Ms. Deshpande reports the following:     Practice of the breathing exercises several times per day; it feels natural    Also some practice of the vocie  exercises; wasn't sure about some of the techniques and wants clarification    She used the strategies for avoiding throat clearing - that was helpful    She hasn't don't the st raw phonation exercises , because she didn't have a straw; planned to st art when she arrives in [place] (where she has straws)    Ms. Deshpande also states that:    She expects she will be going into respiratory therapy, which will include coughing, and she's eager to have strategies to protect her vocal fold mucosa    Gargling alkaline water is helpful to reduce throat clearing    She feels she understands the strategies for reducing throat-clearing; although some clears are necesaary, she is now more mindful about each throat-clear, and not doing it when it's not necessary     Ms. Deshpande presents today with the following:    Neck muscle tension when talking  Voice quality:    Mild roughness and strain that are hard to differentiate; they are WNL    Secretion noise that is inconsistent      Bursts of a more clear quality; can be well WNL    Pitch is most often around E3 to G3, but bursts at higher pitch (around E4) are more clear  Cough/ Throat clear:    Occasional throat clear, mildly wet, that results in a more clear quality    THERAPEUTIC ACTIVITIES  Today Ms. Deshpande participated in the following therapeutic activities:    In response to her questions, I provided explanations of whether to use moth breathing or nose breathing when doing the voice exercises  o She will end up using both.    [place] exercises to add phonation to the optimal flowing airstream.  o Semi-occluded vocal tract exercise with a puffed upper lip configuration was most facilitating  o She needed cueing for optimal pitch range; tended to spend too much time at her lowest pitches, with glottal fr y  o Also needed cueing to take a breath before phonation  o good learning, but will need practice     [place] techniques to use an optimal pitch range in speech.  o today s pitch  "range: G3 to B3   o able to maintain in chant mode, using simple recited material such as counting, in both ascending and descending pitch patterns  o These pitches resulted in a much more clear quality  o Needed frequent cueing to avoid returning to E3  In conversational speech  o [place] how to use simple phrases at these pitches (such as \"m-hm\") in conversation, to promote use of th jada pitches  o Able to hear and feel the improvment    [place] concepts of an optimal regimen for practice.  o she should use an interval schedule of practice, with brief periods of practice frequently throughout each day  o [place] concepts of volitional practice to facilitate motor learning;  Pitch, airflow, forward focus    An audio recording of today's therapeutic activities was made, to facilitate practice.    IMPRESSIONS/GOALS/PLAN  Ms. Deshpande had a productive session of speech therapy today, to address the following:  Dysphonia (R49.0)   Laryngeal Hyperfunction (J38.7)   Globus Sensation (R09.89)  Speech therapy for her is medically necessary to allow  her to meet personal and professional demands and fully engage in activities of daily living.    Progress toward long-term goals: (Reporting period: 11/21/23  to 2/19/23)  Minimal at this point, as this is second session, but good learning today    Goals for this practice period:     practice all exercises according to instructions    incorporate techniques into daily vocal activities    maintain vigilance for vocal technique    Plan: Ms. Charlee gilebrt return to therapy with  Ms. Alvarado or myself in June to work on education, modification, and carryover of therapeutic activities to more complex activities.    Reporting period 11/21/23 - 2/19/24    TOTAL SERVICE TIME: 60 minutes  TREATMENT (71168)      Imelda Sepulveda, Ph.D., [facility]-SLP  Speech-Language Pathologist  Director, [facility]  She/her/hers  [phone]              "

## 2023-12-04 NOTE — LETTER
12/4/2023       RE: Soraida Deshpande  [address] [facility] [address]     Dear Colleague,    Thank you for referring your patient, Soraida Deshpande, to the [facility] VOICE CLINIC [facility]. Please see a copy of my visit note below.    Soraida Deshpande is a 78 year old female who is being evaluated via a billable video visit.      Soraida has been notified and verbally consented to the following:   This video visit will be conducted between you and your provider.  Patient has opted to conduct today's video visit vs an in-person appointment.   Video visits are billed at different rates depending on your insurance coverage. Please reach out to your insurance provider with any questions.   If during the course of the call the provider feels the appointment is not appropriate, you will not be charged for this service.  Provider has received verbal consent for billable virtual visit from the patient? Yes  Will anyone else be joining your video visit? No    Call initiated at: 1:30 PM   Type of Visit Platform Used: [employer]   Location of provider: Home  Location of patient: [facility] VOICE [facility]  Arturo Sutton Jr., M.D., F.A.C.S.  May Nance M.D., M.P.H.  Mary Calderon M.D.  Rach Elizabeth M.M. (voice), M.A., CCC-SLP  Ana Alvarado M.S., CCC-SLP  Imelda Sepulveda, Ph.D., CCC-SLP  Adarsh Garcia, Ph.D., CCC-SLP  Tracee Baptiste M.S., CCC-SLP  Pk Corrigan M.M., M.A., CCC-SLP  SHELTON Odonnell (voice), M.S., CCC-SLP    [facility]  VOICE/SPEECH/BREATHING THERAPY PROGRESS REPORT    Patient: Soraida Deshpande  Date of Service: 12/4/2023    Date of Last Service: 11/29/23  Referring physician: Dr. Calderon  Initial evaluation: 11/21/23  Therapy Session #2     I had the pleasure of seeing Ms. Deshpande today, for speech therapy to address a diagnosis of:  Dysphonia (R49.0)   Laryngeal Hyperfunction (J38.7)  Globus Sensation  (R09.89).    PROGRESS SINCE LAST SESSION  At the last session, Ms. Deshpande worked on therapeutic activities to address the above diagnosis.    Regarding practice, Ms. Deshpande reports the following:   Practice of the breathing exercises several times per day; it feels natural  Also some practice of the vocie exercises; wasn't sure about some of the techniques and wants clarification  She used the strategies for avoiding throat clearing - that was helpful  She hasn't don't the st raw phonation exercises , because she didn't have a straw; planned to st art when she arrives in [place] (where she has straws)    Ms. Deshpande also states that:  She expects she will be going into respiratory therapy, which will include coughing, and she's eager to have strategies to protect her vocal fold mucosa  Gargling alkaline water is helpful to reduce throat clearing  She feels she understands the strategies for reducing throat-clearing; although some clears are necesaary, she is now more mindful about each throat-clear, and not doing it when it's not necessary     Ms. Deshpande presents today with the following:  Neck muscle tension when talking  Voice quality:  Mild roughness and strain that are hard to differentiate; they are WNL  Secretion noise that is inconsistent    Bursts of a more clear quality; can be well WNL  Pitch is most often around E3 to G3, but bursts at higher pitch (around E4) are more clear  Cough/ Throat clear:  Occasional throat clear, mildly wet, that results in a more clear quality    THERAPEUTIC ACTIVITIES  Today Ms. Deshpande participated in the following therapeutic activities:  In response to her questions, I provided explanations of whether to use moth breathing or nose breathing when doing the voice exercises  She will end up using both.  [place] exercises to add phonation to the optimal flowing airstream.  Semi-occluded vocal tract exercise with a puffed upper lip configuration was most facilitating  She needed cueing  "for optimal pitch range; tended to spend too much time at her lowest pitches, with glottal fr y  Also needed cueing to take a breath before phonation  good learning, but will need practice   [place] techniques to use an optimal pitch range in speech.  today s pitch range: G3 to B3   able to maintain in chant mode, using simple recited material such as counting, in both ascending and descending pitch patterns  These pitches resulted in a much more clear quality  Needed frequent cueing to avoid returning to E3  In conversational speech  [place] how to use simple phrases at these pitches (such as \"m-hm\") in conversation, to promote use of th jada pitches  Able to hear and feel the improvment  [place] concepts of an optimal regimen for practice.  she should use an interval schedule of practice, with brief periods of practice frequently throughout each day  [place] concepts of volitional practice to facilitate motor learning;  Pitch, airflow, forward focus  An audio recording of today's therapeutic activities was made, to facilitate practice.    IMPRESSIONS/GOALS/PLAN  Ms. Deshpande had a productive session of speech therapy today, to address the following:  Dysphonia (R49.0)   Laryngeal Hyperfunction (J38.7)   Globus Sensation (R09.89)  Speech therapy for her is medically necessary to allow  her to meet personal and professional demands and fully engage in activities of daily living.    Progress toward long-term goals: (Reporting period: 11/21/23  to 2/19/23)  Minimal at this point, as this is second session, but good learning today    Goals for this practice period:   practice all exercises according to instructions  incorporate techniques into daily vocal activities  maintain vigilance for vocal technique    Plan: Ms. Charlee gilbert return to therapy with  Ms. Alvarado or myself in June to work on education, modification, and carryover of therapeutic activities to more complex activities.    Reporting period 11/21/23 - " 2/19/24    TOTAL SERVICE TIME: 60 minutes  TREATMENT ([id number])      Imelda Sepulveda, Ph.D., [facility]-SLP  Speech-Language Pathologist  Director, [facility]  She/her/hers  [phone]

## 2024-02-06 ENCOUNTER — TELEPHONE (OUTPATIENT)
Dept: OTOLARYNGOLOGY | Facility: CLINIC | Age: 79
End: 2024-02-06
Payer: COMMERCIAL

## 2024-06-01 ENCOUNTER — HEALTH MAINTENANCE LETTER (OUTPATIENT)
Age: 79
End: 2024-06-01

## 2024-07-24 NOTE — PROGRESS NOTES
[facility]   at the [facility]   Otolaryngology Clinic     Patient: Soraida Deshpande    MRN: [id number]    : 1945    Age/Gender: 79 year old female  Date of Service: 2024  Rendering Provider:   Mary Calderon MD         Impression & Plan     IMPRESSION: Ms. Deshpande is a 78 year old female who is being seen for the following:   Chronic cough/throat clearing   - started 2 years ago   - has improved overtime  - associated with sensation of mucus  - has associated sensation of globus sensation  - denies cigarettes  - allergy triggers and work up: reports negative work up  - pulmonary triggers and work up: does have pulmonary infection on bronchoscopy, cough improved once she started treatment for this - she is still undergoing treatment  - GI triggers and work up: denies reflux, denies AM LPRD symptoms, started antireflux diet because this helps her mucus build up - she lost 20 lbs because of this - worried that reflux is making her lung infection worse, had EGD with finding of inlet patch   - ENT triggers and work up: throat clears more with talking  Xray Video Swallow Exam 2023 reviewed today, looks normal   - scope shows left false vocal fold swelling that impairs vibration of the left true vocal fold, no pooling of saliva  - FEES shows no penetration, no aspiration, throat cleared with apple juice  - symptoms likely due to pulmonary etiology given infection, but now she also has 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 Deshpande is a 79 year old female is being followed for globus sensation and dysphonia . she was initially seen on 11/21/23. Please refer to this note for full history.     Of note she had a bilateral thyroidectomy in 2007     Today, she presents for follow up. she reports:  - working up bronchiectasis     PAST MEDICAL HISTORY:   Past Medical History:   Diagnosis Date    Absence of menstruation     postmenopausal    Closed fracture of shaft of humerus     Urinary tract infection, site not specified     recurrent       PAST SURGICAL HISTORY:   Past Surgical History:   Procedure Laterality Date    NO HISTORY OF SURGERY         CURRENT MEDICATIONS:   Current Outpatient Medications:     ASPIRIN 81 MG OR TABS,    1 po qd, Disp: , Rfl:     atorvastatin (LIPITOR) 40 MG tablet, daily, Disp: , Rfl:     B COMPLEX 1 OR TABS,    1 po qd, Disp: , Rfl:     CALCIUM + D 600-200 MG-IU OR TABS,       2 po qd, Disp: , Rfl:     calcium carbonate (OS-CARLA) 600 MG tablet, Take 600 mg by mouth, Disp: , Rfl:     cetirizine  (ZYRTEC) 10 MG tablet, Take 10 mg by mouth daily, Disp: , Rfl:     co-enzyme Q-10 100 MG CAPS capsule, , Disp: , Rfl:     conjugated estrogens (PREMARIN) 0.625 MG/GM vaginal cream, Insert  vaginally three times a week., Disp: , Rfl:     D-Mannose 500 MG CAPS, Take 2,000 mg by mouth, Disp: , Rfl:     estradiol (VAGIFEM) 10 MCG TABS vaginal tablet, Place 10 mcg vaginally, Disp: , Rfl:     famotidine (PEPCID) 40 MG tablet, Take 40 mg by mouth 2 times daily, Disp: , Rfl:     FEMHRT 1/5 1-5 MG-MCG OR TABS,    1 po qd, Disp: , Rfl:     FISH OIL 1000 MG OR CAPS,     2 grams po qd, Disp: , Rfl:     FLAX SEED OIL 1000 MG OR CAPS, daily, Disp: , Rfl: 0    FOLIC ACID 400 MCG OR TABS, 1 TABLET DAILY, Disp: 30, Rfl: 0    ipratropium (ATROVENT) 0.06 % nasal spray, USE 2 SPRAYS IN EACH NOSTRIL THREE TIMES DAILY, Disp: , Rfl:     levothyroxine (SYNTHROID/LEVOTHROID) 100 MCG tablet, daily, Disp: , Rfl:     LORazepam (ATIVAN) 0.5 MG tablet, lorazepam 0.5 mg tablet, Disp: , Rfl:     Lutein-Zeaxanthin 25-5 MG CAPS, Take by mouth daily, Disp: , Rfl:     metroNIDAZOLE (METROCREAM) 0.75 % external cream, Apply 1 g topically, Disp: , Rfl:     Multiple Vitamin (MULTI-VITAMINS) TABS, Take 1 tablet by mouth daily, Disp: , Rfl:     sucralfate (CARAFATE) 1 GM tablet, TAKE 1 TABLET BY MOUTH FOUR TIMES DAILY AS DIRECTED, Disp: , Rfl:     VITAMIN C 500 MG OR TABS,    1 po qd, Disp: , Rfl:     VITAMIN E 400 IU OR CAPS,    1 po qd, Disp: , Rfl:     ALLERGIES: Nickel, Bacitracin-polymyxin b, Cephalexin, Cobalt, Nitrofurantoin, Sulfamethoxazole-trimethoprim, Trimethoprim, Dave-bacit-poly-lidocaine, Onhqz-mdomd-sfxnzfl-pramoxine, and Risedronate    SOCIAL HISTORY:    Social History     Socioeconomic History    Marital status:      Spouse name: Not on file    Number of children: Not on file    Years of education: Not on file    Highest education level: Not on file   Occupational History    Not on file   Tobacco Use    Smoking status: Former      Current packs/day: 0.00     Types: Cigarettes     Quit date: 1975     Years since quittin.7    Smokeless tobacco: Never    Tobacco comments:     quit at age 30 after smoking for 6 years at 1 ppd   Substance and Sexual Activity    Alcohol use: Yes     Comment: 7-10 per week    Drug use: No    Sexual activity: Not on file   Other Topics Concern    Not on file   Social History Narrative    Not on file     Social Determinants of Health     Financial Resource Strain: Not on file   Food Insecurity: Not on file   Transportation Needs: Not on file   Physical Activity: Not on file   Stress: Not on file   Social Connections: Not on file   Interpersonal Safety: Not on file   Housing Stability: Not on file         FAMILY HISTORY:   Family History   Problem Relation Age of Onset    C.A.D. Father         first MI in his 40's    Cerebrovascular Disease Father     Osteoporosis Mother     Cardiovascular Mother         carotid endarterectomy, pacemaker    Hypertension Mother     Breast Cancer Paternal Aunt     Cancer - colorectal Maternal Aunt       Non-contributory for problems with anesthesia    REVIEW OF SYSTEMS:   The patient was asked a 14 point review of systems regarding constitutional symptoms, eye symptoms, ears, nose, mouth, throat symptoms, cardiovascular symptoms, respiratory symptoms, gastrointestinal symptoms, genitourinary symptoms, musculoskeletal symptoms, integumentary symptoms, neurological symptoms, psychiatric symptoms, endocrine symptoms, hematologic/lymphatic symptoms, and allergic/ immunologic symptoms.   The pertinent factors have been included in the HPI and below.  Patient Supplied Answers to Review of Systems      2024    12:08 PM   [facility] ROS   Constitutional Weight loss   Psychology Frequently feeling depressed or sad   Ears, Nose, Throat Ringing/noise in ears    Nasal congestion or drainage    Hoarseness   Cardiopulmonary Breathing problems   Musculoskeletal Sore or stiff joints    Back  pain    Neck pain   Endocrine Heat or cold intolerance   Other Rash       Physical Examination   The patient underwent a physical examination as described below. The pertinent positive and negative findings are summarized after the description of the examination.  Constitutional: The patient's developmental and nutritional status was assessed. The patient's voice quality was assessed.  Head and Face: The head and face were inspected for deformities. The sinuses were palpated. The salivary glands were palpated. Facial muscle strength was assessed bilaterally.  Eyes: Extraocular movements and primary gaze alignment were assessed.  Ears, Nose, Mouth and Throat: The ears and nose were examined for deformities. The nasal septum, mucosa, and turbinates were inspected by anterior rhinoscopy. The lips, teeth, and gums were examined for abnormalities. The oral mucosa, tongue, palate, tonsils, lateral and posterior pharynx were inspected for the presence of asymmetry or mucosal lesions.    Neck: The tracheal position was noted, and the neck mass palpated to determine if there were any asymmetries, abnormal neck masses, thyromegally, or thyroid nodules.  Respiratory: The nature of the breathing and chest expansion/symmetry was observed.  Cardiovascular: The patient was examined to determine the presence of any edema or jugular venous distension.  Abdomen: The contour of the abdomen was noted.  Lymphatic: The patient was examined for infraclavicular lymphadenopathy.  Musculoskeletal: The patient was inspected for the presence of skeletal deformities.  Extremities: The extremities were examined for any clubbing or cyanosis.  Skin: The skin was examined for inflammatory or neoplastic conditions.  Neurologic: The patient's orientation, mood, and affect were noted. The cranial nerve  functions were examined.  Other pertinent positive and negative findings on physical examination:   OC/OP: no lesions, uvula midline, soft palate  elevates symmetrically   Neck: no lesions, no TH tenderness to palpation     All other physical examination findings were within normal limits and noncontributory.    Procedures   Flexible laryngoscopy (CPT 36444)      Pre-procedure diagnosis: dysphonia  Post-procedure diagnosis: same as above  Indication for procedure: Ms. Deshpande is a 79 year old female with see above  Procedure(s): Fiberoptic Laryngoscopy    Details of Procedure: After informed consent was obtained, the patient was seated in the examination chair.  The areas of the nasopharynx as well as the hypopharynx were anesthetized with topical 4% lidocaine with 0.25% phenylephrine atomizer.  Examination of the base of tongue was performed first.  Attention was directed to any evidence of masses in the area or evidence of leukoplakia or candidal infection.  Attention was directed to the epiglottis where its size and position was determined and its movement on phonation of the vowel  e .  The piriform sinuses were then inspected for any mass lesions or pooling of secretions.  Attention was then directed to the larynx. The vocal folds were inspected for infection or any areas of leukoplakia, for masses, polypoid degeneration, or hemorrhage.  Having done this, the arytenoids and vocal processes were inspected for erythema or evidence of granuloma formation.  The posterior commissure was then inspected for evidence of inflammatory changes in the mucosa and heaping up of mucosal tissue. The patient was then instructed to say the vowel  e .  Adduction of vocal folds to the midline was observed for any evidence of paresis or paralysis of the larynx or asymmetry in rotation of the larynx to the left or right. The patient was asked to breathe and the degree of abduction was noted bilaterally.  Subglottic view of the larynx was obtained for any additional mass lesions or mucosal changes.  Finally the post cricoid was examined for evidence of pooling of secretions, as  "well as the pharyngeal wall mucosa.   Anesthesia type: 0.25% phenylephrine    Findings:  Anatomic/physiological deviations: RNC, left false vocal fold swelling   Right vocal process: No restriction of mobility   Left vocal process: No restriction of mobility  Glottal gap: Complete glottal closure  Supraglottic structures: Normal  Hypopharynx: Normal     Estimated Blood Loss: minimal  Complications: None  Disposition: Patient tolerated the procedure well               Review of Relevant Clinical Data   I personally reviewed:    Radiology: CT chest 7/17/24  Pathology:   Procedures:    Labs:  Lab Results   Component Value Date    TSH 0.86 11/22/2005     No results found for: \"NA\", \"CO2\", \"BUN\", \"CREAT\", \"GLUCOSE\", \"PHOS\"  No results found for: \"WBC\", \"HGB\", \"HCT\", \"MCV\", \"PLT\"  No results found for: \"PT\", \"PTT\", \"INR\"  No results found for: \"DALLAS\"  No components found for: \"RHEUMATOIDFACTOR\", \"RF\"  No results found for: \"CRP\"  No components found for: \"CKTOT\", \"URICACID\"  No components found for: \"C3\", \"C4\", \"DSDNAAB\", \"NDNAABIFA\"  No results found for: \"MPOAB\"    Patient reported Quality of Life (QOL) Measures   Patient Supplied Answers To VHI Questionnaire      7/23/2024    12:14 PM   Voice Handicap Index (VHI-10)   My voice makes it difficult for people to hear me 2   People have difficulty understanding me in a noisy room 2   My voice difficulties restrict my personal and social life.  1   I feel left out of conversations because of my voice 1   My voice problem causes me to lose income 1   I feel as though I have to strain to produce voice 2   The clarity of my voice is unpredictable 2   My voice problem upsets me 1   My voice makes me feel handicapped 1   People ask, \"What's wrong with your voice?\" 1   VHI-10 14         Patient Supplied Answers To EAT Questionnaire       No data to display                  Patient Supplied Answers To CSI Questionnaire      7/23/2024    12:14 PM   Cough Severity Index (CSI)   My " cough is worse when I lie down 2   My coughing problem causes me to restrict my personal and social life 1   I tend to avoid places because of my cough problem 1   I feel embarrassed because of my coughing problem 1   People ask, ''What's wrong?'' because I cough a lot 2   I run out of air when I cough 2   My coughing problem affects my voice 2   My coughing problem limits my physical activity 2   My coughing problem upsets me 1   People ask me if I am sick because I cough a lot 2   CSI Score 16       [email or web address]    Scribe Disclosure:   I, RAFAEL BARKLEY, am serving as a scribe; to document services personally performed by Mary Calderon MD -based on data collection and the provider's statements to me.     Provider Disclosure:  I agree with above History, Review of Systems, Physical exam and Plan.  I have reviewed the content of the documentation and have edited it as needed. I have personally performed the services documented here and the documentation accurately represents those services and the decisions I have made.      Electronically signed by:    Mary Calderon MD    Laryngology    [facility]  Department of  Otolaryngology - Head and Neck Surgery  Clinics & Surgery Center  [address] [place] [address]  Appointment line: [phone]  Fax: [phone]  [email or web address]

## 2024-07-25 ENCOUNTER — DOCUMENTATION ONLY (OUTPATIENT)
Dept: OTOLARYNGOLOGY | Facility: CLINIC | Age: 79
End: 2024-07-25

## 2024-07-25 ENCOUNTER — OFFICE VISIT (OUTPATIENT)
Dept: OTOLARYNGOLOGY | Facility: CLINIC | Age: 79
End: 2024-07-25
Payer: COMMERCIAL

## 2024-07-25 VITALS
SYSTOLIC BLOOD PRESSURE: 159 MMHG | BODY MASS INDEX: 17.83 KG/M2 | HEART RATE: 72 BPM | DIASTOLIC BLOOD PRESSURE: 91 MMHG | WEIGHT: 107 LBS | HEIGHT: 65 IN

## 2024-07-25 DIAGNOSIS — R09.89 CHRONIC THROAT CLEARING: ICD-10-CM

## 2024-07-25 DIAGNOSIS — R49.0 DYSPHONIA: Primary | ICD-10-CM

## 2024-07-25 DIAGNOSIS — J38.7 LESION OF LARYNX: ICD-10-CM

## 2024-07-25 PROCEDURE — 92524 BEHAVRAL QUALIT ANALYS VOICE: CPT | Mod: GN | Performed by: SPEECH-LANGUAGE PATHOLOGIST

## 2024-07-25 PROCEDURE — 31575 DIAGNOSTIC LARYNGOSCOPY: CPT | Performed by: OTOLARYNGOLOGY

## 2024-07-25 PROCEDURE — 99214 OFFICE O/P EST MOD 30 MIN: CPT | Mod: 25 | Performed by: OTOLARYNGOLOGY

## 2024-07-25 RX ORDER — ALBUTEROL SULFATE 90 UG/1
2 AEROSOL, METERED RESPIRATORY (INHALATION) EVERY 6 HOURS PRN
COMMUNITY
Start: 2023-08-16

## 2024-07-25 RX ORDER — CHLORHEXIDINE GLUCONATE ORAL RINSE 1.2 MG/ML
SOLUTION DENTAL DAILY
COMMUNITY

## 2024-07-25 RX ORDER — GUAIFENESIN 1200 MG/1
TABLET, EXTENDED RELEASE ORAL
COMMUNITY
Start: 2024-01-01

## 2024-07-25 NOTE — LETTER
2024       RE: Soraida Deshpande  [address] [facility] [address]     Dear Colleague,    Thank you for referring your patient, Soraida Deshpande, to the [facility] EAR NOSE AND [facility] [place] at [facility]. Please see a copy of my visit note below.        [facility]   at the [facility]   Otolaryngology Clinic     Patient: Soraida Deshpande    MRN: [id number]    : 1945    Age/Gender: 79 year old female  Date of Service: 2024  Rendering Provider:   Mary Calderon MD         Impression & Plan     IMPRESSION: Ms. Deshpande is a 78 year old female who is being seen for the following:   Chronic cough/throat clearing   - started 2 years ago   - has improved overtime  - associated with sensation of mucus  - has associated sensation of globus sensation  - denies cigarettes  - allergy triggers and work up: reports negative work up  - pulmonary triggers and work up: does have pulmonary infection on bronchoscopy, cough improved once she started treatment for this - she is still undergoing treatment  - GI triggers and work up: denies reflux, denies AM LPRD symptoms, started antireflux diet because this helps her mucus build up - she lost 20 lbs because of this - worried that reflux is making her lung infection worse, had EGD with finding of inlet patch   - ENT triggers and work up: throat clears more with talking  Xray Video Swallow Exam 2023 reviewed today, looks normal   - scope shows left false vocal fold swelling that impairs vibration of the left true vocal fold, no pooling of saliva  - FEES shows no penetration, no aspiration, throat cleared with apple juice  - symptoms likely due to pulmonary etiology given infection, but now she also has irritable larynx syndrome and muscle tension dysphonia - she had throat clearing with the apple juice pointing to irritable larynx syndrome   - symptoms 2024 are about the same, was  diagnosed with bronchiectasis, being worked up for CF, did have voice therapy which helped but with everything going on did not keep up with the exercises   Plan  - cough/throat clearing suppression therapy as needed         2. 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 Deshpande is a 79 year old female is being followed for globus sensation and dysphonia . she was initially seen on 11/21/23. Please refer to this note for full history.     Of note she had a bilateral thyroidectomy in 2007     Today, she presents for follow up. she reports:  - working up bronchiectasis     PAST MEDICAL HISTORY:   Past Medical History:   Diagnosis Date     Absence of menstruation     postmenopausal     Closed fracture of shaft of humerus      Urinary tract infection, site not specified     recurrent       PAST SURGICAL HISTORY:   Past Surgical History:   Procedure Laterality Date     NO HISTORY OF SURGERY         CURRENT MEDICATIONS:   Current Outpatient Medications:       ASPIRIN 81 MG OR TABS,    1 po qd, Disp: , Rfl:      atorvastatin (LIPITOR) 40 MG tablet, daily, Disp: , Rfl:      B COMPLEX 1 OR TABS,    1 po qd, Disp: , Rfl:      CALCIUM + D 600-200 MG-IU OR TABS,       2 po qd, Disp: , Rfl:      calcium carbonate (OS-CARLA) 600 MG tablet, Take 600 mg by mouth, Disp: , Rfl:      cetirizine (ZYRTEC) 10 MG tablet, Take 10 mg by mouth daily, Disp: , Rfl:      co-enzyme Q-10 100 MG CAPS capsule, , Disp: , Rfl:      conjugated estrogens (PREMARIN) 0.625 MG/GM vaginal cream, Insert  vaginally three times a week., Disp: , Rfl:      D-Mannose 500 MG CAPS, Take 2,000 mg by mouth, Disp: , Rfl:      estradiol (VAGIFEM) 10 MCG TABS vaginal tablet, Place 10 mcg vaginally, Disp: , Rfl:      famotidine (PEPCID) 40 MG tablet, Take 40 mg by mouth 2 times daily, Disp: , Rfl:      FEMHRT 1/5 1-5 MG-MCG OR TABS,    1 po qd, Disp: , Rfl:      FISH OIL 1000 MG OR CAPS,     2 grams po qd, Disp: , Rfl:      FLAX SEED OIL 1000 MG OR CAPS, daily, Disp: , Rfl: 0     FOLIC ACID 400 MCG OR TABS, 1 TABLET DAILY, Disp: 30, Rfl: 0     ipratropium (ATROVENT) 0.06 % nasal spray, USE 2 SPRAYS IN EACH NOSTRIL THREE TIMES DAILY, Disp: , Rfl:      levothyroxine (SYNTHROID/LEVOTHROID) 100 MCG tablet, daily, Disp: , Rfl:      LORazepam (ATIVAN) 0.5 MG tablet, lorazepam 0.5 mg tablet, Disp: , Rfl:      Lutein-Zeaxanthin 25-5 MG CAPS, Take by mouth daily, Disp: , Rfl:      metroNIDAZOLE (METROCREAM) 0.75 % external cream, Apply 1 g topically, Disp: , Rfl:      Multiple Vitamin (MULTI-VITAMINS) TABS, Take 1 tablet by mouth daily, Disp: , Rfl:      sucralfate (CARAFATE) 1 GM tablet, TAKE 1 TABLET BY MOUTH FOUR TIMES DAILY AS DIRECTED, Disp: , Rfl:      VITAMIN C 500 MG OR TABS,    1 po qd, Disp: , Rfl:      VITAMIN E 400 IU OR CAPS,    1 po qd, Disp: , Rfl:     ALLERGIES: Nickel, Bacitracin-polymyxin b, Cephalexin, Cobalt, Nitrofurantoin, Sulfamethoxazole-trimethoprim, Trimethoprim, Dave-bacit-poly-lidocaine,  Wbitc-zcxyz-zdddzrj-pramoxine, and Risedronate    SOCIAL HISTORY:    Social History     Socioeconomic History     Marital status:      Spouse name: Not on file     Number of children: Not on file     Years of education: Not on file     Highest education level: Not on file   Occupational History     Not on file   Tobacco Use     Smoking status: Former     Current packs/day: 0.00     Types: Cigarettes     Quit date: 1975     Years since quittin.7     Smokeless tobacco: Never     Tobacco comments:     quit at age 30 after smoking for 6 years at 1 ppd   Substance and Sexual Activity     Alcohol use: Yes     Comment: 7-10 per week     Drug use: No     Sexual activity: Not on file   Other Topics Concern     Not on file   Social History Narrative     Not on file     Social Determinants of Health     Financial Resource Strain: Not on file   Food Insecurity: Not on file   Transportation Needs: Not on file   Physical Activity: Not on file   Stress: Not on file   Social Connections: Not on file   Interpersonal Safety: Not on file   Housing Stability: Not on file         FAMILY HISTORY:   Family History   Problem Relation Age of Onset     C.A.D. Father         first MI in his 40's     Cerebrovascular Disease Father      Osteoporosis Mother      Cardiovascular Mother         carotid endarterectomy, pacemaker     Hypertension Mother      Breast Cancer Paternal Aunt      Cancer - colorectal Maternal Aunt       Non-contributory for problems with anesthesia    REVIEW OF SYSTEMS:   The patient was asked a 14 point review of systems regarding constitutional symptoms, eye symptoms, ears, nose, mouth, throat symptoms, cardiovascular symptoms, respiratory symptoms, gastrointestinal symptoms, genitourinary symptoms, musculoskeletal symptoms, integumentary symptoms, neurological symptoms, psychiatric symptoms, endocrine symptoms, hematologic/lymphatic symptoms, and allergic/ immunologic symptoms.   The pertinent factors  have been included in the HPI and below.  Patient Supplied Answers to Review of Systems      7/23/2024    12:08 PM   [facility] E[facility] ROS   Constitutional Weight loss   Psychology Frequently feeling depressed or sad   Ears, Nose, Throat Ringing/noise in ears    Nasal congestion or drainage    Hoarseness   Cardiopulmonary Breathing problems   Musculoskeletal Sore or stiff joints    Back pain    Neck pain   Endocrine Heat or cold intolerance   Other Rash       Physical Examination   The patient underwent a physical examination as described below. The pertinent positive and negative findings are summarized after the description of the examination.  Constitutional: The patient's developmental and nutritional status was assessed. The patient's voice quality was assessed.  Head and Face: The head and face were inspected for deformities. The sinuses were palpated. The salivary glands were palpated. Facial muscle strength was assessed bilaterally.  Eyes: Extraocular movements and primary gaze alignment were assessed.  Ears, Nose, Mouth and Throat: The ears and nose were examined for deformities. The nasal septum, mucosa, and turbinates were inspected by anterior rhinoscopy. The lips, teeth, and gums were examined for abnormalities. The oral mucosa, tongue, palate, tonsils, lateral and posterior pharynx were inspected for the presence of asymmetry or mucosal lesions.    Neck: The tracheal position was noted, and the neck mass palpated to determine if there were any asymmetries, abnormal neck masses, thyromegally, or thyroid nodules.  Respiratory: The nature of the breathing and chest expansion/symmetry was observed.  Cardiovascular: The patient was examined to determine the presence of any edema or jugular venous distension.  Abdomen: The contour of the abdomen was noted.  Lymphatic: The patient was examined for infraclavicular lymphadenopathy.  Musculoskeletal: The patient was inspected for the presence of skeletal  deformities.  Extremities: The extremities were examined for any clubbing or cyanosis.  Skin: The skin was examined for inflammatory or neoplastic conditions.  Neurologic: The patient's orientation, mood, and affect were noted. The cranial nerve  functions were examined.  Other pertinent positive and negative findings on physical examination:   OC/OP: no lesions, uvula midline, soft palate elevates symmetrically   Neck: no lesions, no TH tenderness to palpation     All other physical examination findings were within normal limits and noncontributory.    Procedures   Flexible laryngoscopy (CPT 03304)      Pre-procedure diagnosis: dysphonia  Post-procedure diagnosis: same as above  Indication for procedure: Ms. Deshpande is a 79 year old female with see above  Procedure(s): Fiberoptic Laryngoscopy    Details of Procedure: After informed consent was obtained, the patient was seated in the examination chair.  The areas of the nasopharynx as well as the hypopharynx were anesthetized with topical 4% lidocaine with 0.25% phenylephrine atomizer.  Examination of the base of tongue was performed first.  Attention was directed to any evidence of masses in the area or evidence of leukoplakia or candidal infection.  Attention was directed to the epiglottis where its size and position was determined and its movement on phonation of the vowel  e .  The piriform sinuses were then inspected for any mass lesions or pooling of secretions.  Attention was then directed to the larynx. The vocal folds were inspected for infection or any areas of leukoplakia, for masses, polypoid degeneration, or hemorrhage.  Having done this, the arytenoids and vocal processes were inspected for erythema or evidence of granuloma formation.  The posterior commissure was then inspected for evidence of inflammatory changes in the mucosa and heaping up of mucosal tissue. The patient was then instructed to say the vowel  e .  Adduction of vocal folds to the  "midline was observed for any evidence of paresis or paralysis of the larynx or asymmetry in rotation of the larynx to the left or right. The patient was asked to breathe and the degree of abduction was noted bilaterally.  Subglottic view of the larynx was obtained for any additional mass lesions or mucosal changes.  Finally the post cricoid was examined for evidence of pooling of secretions, as well as the pharyngeal wall mucosa.   Anesthesia type: 0.25% phenylephrine    Findings:  Anatomic/physiological deviations: RNC, left false vocal fold swelling   Right vocal process: No restriction of mobility   Left vocal process: No restriction of mobility  Glottal gap: Complete glottal closure  Supraglottic structures: Normal  Hypopharynx: Normal     Estimated Blood Loss: minimal  Complications: None  Disposition: Patient tolerated the procedure well               Review of Relevant Clinical Data   I personally reviewed:    Radiology: CT chest 7/17/24  Pathology:   Procedures:    Labs:  Lab Results   Component Value Date    TSH 0.86 11/22/2005     No results found for: \"NA\", \"CO2\", \"BUN\", \"CREAT\", \"GLUCOSE\", \"PHOS\"  No results found for: \"WBC\", \"HGB\", \"HCT\", \"MCV\", \"PLT\"  No results found for: \"PT\", \"PTT\", \"INR\"  No results found for: \"DALLAS\"  No components found for: \"RHEUMATOIDFACTOR\", \"RF\"  No results found for: \"CRP\"  No components found for: \"CKTOT\", \"URICACID\"  No components found for: \"C3\", \"C4\", \"DSDNAAB\", \"NDNAABIFA\"  No results found for: \"MPOAB\"    Patient reported Quality of Life (QOL) Measures   Patient Supplied Answers To VHI Questionnaire      7/23/2024    12:14 PM   Voice Handicap Index (VHI-10)   My voice makes it difficult for people to hear me 2   People have difficulty understanding me in a noisy room 2   My voice difficulties restrict my personal and social life.  1   I feel left out of conversations because of my voice 1   My voice problem causes me to lose income 1   I feel as though I have to strain " "to produce voice 2   The clarity of my voice is unpredictable 2   My voice problem upsets me 1   My voice makes me feel handicapped 1   People ask, \"What's wrong with your voice?\" 1   VHI-10 14         Patient Supplied Answers To EAT Questionnaire       No data to display                  Patient Supplied Answers To CSI Questionnaire      7/23/2024    12:14 PM   Cough Severity Index (CSI)   My cough is worse when I lie down 2   My coughing problem causes me to restrict my personal and social life 1   I tend to avoid places because of my cough problem 1   I feel embarrassed because of my coughing problem 1   People ask, ''What's wrong?'' because I cough a lot 2   I run out of air when I cough 2   My coughing problem affects my voice 2   My coughing problem limits my physical activity 2   My coughing problem upsets me 1   People ask me if I am sick because I cough a lot 2   CSI Score 16       [email or web address]    Scribe Disclosure:   I, RAFAEL BARKLEY, am serving as a scribe; to document services personally performed by Mary Calderon MD -based on data collection and the provider's statements to me.     Provider Disclosure:  I agree with above History, Review of Systems, Physical exam and Plan.  I have reviewed the content of the documentation and have edited it as needed. I have personally performed the services documented here and the documentation accurately represents those services and the decisions I have made.      Electronically signed by:    Mary Calderon MD    Laryngology    [facility]  Department of  Otolaryngology - Head and Neck Surgery  Clinics & [place] [address]  Appointment line: [phone]  Fax: [phone]  [email or web address]         Again, thank you for allowing me to participate in the care of your patient.      Sincerely,    Mary Calderon MD    "

## 2024-07-25 NOTE — PROGRESS NOTES
[facility] VOICE CLINIC  CLINICAL EVALUATION REPORT    Patient: Soraida Deshpande  Date of Service: 7/25/2024  Clinician: Leandra Odonnell, MS, CCC-SLP  Seen in conjunction with: Dr. Mary Calderon  Certification Dates: EVAL ONLY  State of Residence: [place]  Visit Count: 1    HISTORY  PATIENT INFORMATION  Soraida Deshpande is a 79 year old female presenting today for re-evaluation of dysphonia, chronic throat clearing, and globus sensation. She was last seen on 12/4/23 by Dr. ABEBE Sepulveda for a second therapy session, before going to [place] for the winter. She returns today to see Dr. Calderon, following [place] diagnosis of bronchiectasis. Salient details of her symptom history are as follows:  Chief complaint: She doesn't see much change in her symptoms since last November. In September and October she had a bronchoscopy due to a suspected lung infection. 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 She was then evaluated in [place] and was diagnosed with bronchiectasis and has been undergoing additional testing. She was told at [place] that she has a depressed gag reflex. 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 She did recently have improvement shown on a CT at [place], which she thinks is due to airway clearance and nebulized albuterol and 7% hypertonic saline solution. She hasn't done a lot of the voice exercises because her airway clearance takes 4 hours/day. Plus her voice hasn't really been bothering her. 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   Work/Social:  [employer]  Interval History:    From evaluation on 11/21/23 by Ana Alvarado, CCC-SLP: This evaluation has resulted in the following diagnosis/diagnoses for Ms. Deshpande: Dysphonia (R49.0),  Laryngeal Hyperfunction (J38.7), Irritable Larynx Syndrome (ILS) (J38.7), Chronic Throat Clearing (R68.89), Globus Sensation (R09.89).    Laryngeal evaluation demonstrated bowing of the right vocal fold and left vocal fold could not be fully seen due to left ventricular fold obscuring view; left ventricular fold appear bigger and puffy, and it juts out toward midline and obscures the left vocal fold; otherwise within normal limits, with no lesions and straight vibratory margins; Narrow Band Imaging yielded the following: Erythema bilaterally on the vocal folds R>L; mild erythema of the arytenoids and medial arytenoid walls; mild posterior commissure hypertrophy; moderate presence of sticky and bubbly secretions on the vocal folds and throughout the laryngeal area; sticky round mucus at the right false fold that was cleared out of the airway with throat clear; airway is adequate but there appears to be round area (possibly mucus) on the left side, but this is unclear; the vocal folds often demonstrates overlapping scissoring action; the right can appear higher than the left intermittently, and the left can appear higher than the right intermittently but unclear if this is due to angle of camera; bilateral ventricular approximation during phonation with increased compression of the left ventricular fold; left ventricular fold appears to dampen vibration of the true vocal folds and can impair vibration intermittently; marked-severe four-way constriction of the supraglottic larynx during connected speech with more medio-lateral compression; the right vocal fold appears stiff at higher pitches with moderate reduction and at low and middle pitches, amplitude appears mildly reduced; Frequent asymmetry; On phonation, glottic closure is incomplete with frequent spindle-shaped configuration of the glottis during  phonation and more pronounced at higher pitches; open phase predominant  Perceptual evaluation demonstrated  variable mild-moderate roughness; intermittently mild-moderate strain; mild breathiness    CURRENT SYMPTOMS INCLUDE:  VOICE: Her voice cracks a little and sounds a little hoarse, which are variable day-to-day depending on how much she's been talking. Her voice gets worse the more she uses, but she's able to participate as much as she likes to by pushing through. She will occasionally feel soreness in her throat when she's been talking a lot, though she can also wake up with a sore throat at times. She does sleep with her head elevated, but still sometimes wakes up with a dry, sore throat and bitter taste in her mouth.    COUGH/THROAT CLEAR: More throat clearing than coughing. The coughing is mostly when she's doing her airway clearance. The throat clearing is intermittent throughout the day, but she thinks this has gotten better since starting the airway clearance. She only really throat clears due to mucus in her throat and it's generally productive. She does clear her throat more when she's talking.   SWALLOWING: She maybe has something go down the wrong pipe 1-2x/month, which will make her cough, but this isn't any more than the average person. No issues with items getting stuck and she's able to take a bunch of pills together each day.   BREATHING: Normal PFT. CT scan at [place] showed some improvement of bronchiectasis. She does feel SOB at times, but she's able to do a 40-45 minute walk each evening. They'll stop midway, she'll drink some water, and then continue. She can walk the full distance without stopping if she needs to. She can feel some constriction in her chest, but isn't limited in her daily activities.   OTHER: Denies typical pain in her throat, other than occasional waking up with soreness or soreness with prolonged talking.     OTHER PERTINENT HISTORY  Medications: ipratropium, guaifenesin  Reflux: Yes, but doing what she can to manage. [place] declined doing a PH Probe, but she just got an internal  "referral placed last week.   Post-Nasal Drip/Congestion: Constant drainage in her throat.   Please also refer to Dr. Mary Calderon's dictation.     Past Medical History:   Diagnosis Date    Absence of menstruation     postmenopausal    Closed fracture of shaft of humerus     Urinary tract infection, site not specified     recurrent     Past Surgical History:   Procedure Laterality Date    NO HISTORY OF SURGERY         OBJECTIVE FINDINGS  Patient Supplied Answers To VHI Questionnaire      7/23/2024    12:14 PM   Voice Handicap Index (VHI-10)   My voice makes it difficult for people to hear me 2   People have difficulty understanding me in a noisy room 2   My voice difficulties restrict my personal and social life.  1   I feel left out of conversations because of my voice 1   My voice problem causes me to lose income 1   I feel as though I have to strain to produce voice 2   The clarity of my voice is unpredictable 2   My voice problem upsets me 1   My voice makes me feel handicapped 1   People ask, \"What's wrong with your voice?\" 1   VHI-10 14        Patient Supplied Answers To CSI Questionnaire      7/23/2024    12:14 PM   Cough Severity Index (CSI)   My cough is worse when I lie down 2   My coughing problem causes me to restrict my personal and social life 1   I tend to avoid places because of my cough problem 1   I feel embarrassed because of my coughing problem 1   People ask, ''What's wrong?'' because I cough a lot 2   I run out of air when I cough 2   My coughing problem affects my voice 2   My coughing problem limits my physical activity 2   My coughing problem upsets me 1   People ask me if I am sick because I cough a lot 2   CSI Score 16        Patient Supplied Answers To EAT Questionnaire       No data to display                 Patient Supplied Answers to Dyspnea Index Questionnaire:       No data to display             Speech follow up as discussed with patient:       No data to display          " "      PERCEPTUAL EVALUATION (14705)  VOICE/ SPEECH/ NON-COMMUNICATIVE LARYNGEAL BEHAVIORS EVALUATION  Palpation of the laryngeal area shows:  reduced thyrohoid space  no significant tenderness  palpation did not induce cough  Breathing pattern:  clavicular elevation during inspiration and shoulder and neck involvement  Tension:  is evident in the neck  Cough/ Throat clear:  throat clear is primary, observed occasionally during today's session, productive, and locus of cough/throat clear sounds consistent with upper airway   Soraida states today is a typical voice day, with clinician observing voice quality characterized by:  Roughness: Mild Intermittent, sounds wet at times  Breathiness: Minimal  Strain: Mild  Habitual pitch is 210Hz and is WNL  Pitch glide reveals range of 134 to 506Hz  Loudness is WNL and is appropriate for the setting  Maximum Phonation Time: 21 seconds  GLOBAL ASSESSMENT OF DYSPHONIA: 15/100  The GRBAS is a perceptual rating of voice change. 0 indicated no impairment, 3 indicates a severe impairment. \"C\" and \"I\" may be used to signify if these feature was observed consistently or intermittently respectively. This is a rating based on clinical judgement of disordered voice quality.  G ( 0-1 ) General Dysphonia     R ( 0-1 ) Roughness     B ( 0 ) Breathiness     A ( 0 ) Asthenia     S ( 0-1 ) Strain    LARYNGEAL EXAMINATION  Procedure: Flexible endoscopy with chip-tip technology without stroboscopy, right nostril; topical anesthesia with 3% Lidocaine and 0.25% phenylephrine was applied.   Performed by: Dr. Mary Calderon  Verbal consent was obtained and witnessed prior to this procedure.   A time-out was performed, verifying patient, procedure, and site.     This exam shows:  Velar Function: WNL  Secretions:  Minimal  Laryngeal Mucosa: Essentially healthy laryngeal and pharyngeal mucosa  Prominent left-sided ventricular fold  Small round prominence from left-sided tracheal ring visible " subglottically  Vocal fold mucosa:    RTVF - slight edema and erythema located diffusely and along the superior surface, mild bowing  LTVF - slight edema and erythema located diffusely and along the superior surface, mild bowing  Vocal fold function:   Vocal folds are bilaterally mobile and meet at midline, movement is brisk and symmetric, and exam is neurologically normal.  Narrow Band Imaging (NBI) demonstrated: Findings consistent with halogen light  Airway is patent as visualized below the glottis  Elongation of the vocal folds for pitch increase is normal  mild-moderate four-way constrictive supraglottic hyperfunction during connected speech     STIMULABILITY: results of therapy probes during perceptual and laryngeal evaluation demonstrate improvement with Reduced hyperfunction with use of flow/easy onset phonation, yawn-sigh, and improved coordination of respiration and phonation.    ASSESSMENT / PLAN  IMPRESSIONS: Soraida Deshpande is presenting today with Chronic Throat Clearing (R68.89) and Dysphonia (R49.0). Laryngoscopy revealed prominent left-sided ventricular fold, bilateral mild redness/fullness of the vocal folds with mild bowing, mild-moderate 4-way supraglottic hyperfunction. Small round prominence visible along left-sided tracheal ring. She perceptually demonstrated mild strain and mild intermittent roughness, with occasional wet sounding voice quality, non optimal respiratory mechanics, reduced thyrohyoid space, and occasional throat clear. She would benefit from a course of speech therapy targeting reduced laryngeal irritation (throat clear, mucus), improved respiratory mechanics, reduced perilaryngeal hyperfunction, and phonatory patterns targeting reduced laryngeal hyperfunction, however Soraida has enough other medical appointments that she wishes to defer further speech services at this time. She will contact our clinic if and when she chooses to pursue additional speech therapy in the future.    Neck CT per Dr. Calderon    This treatment plan was developed with the patient who agreed with the recommendations.    TOTAL SERVICE TIME: 50 minutes  EVALUATION OF VOICE AND RESONANCE (46329)    Lamberto Odonnell (voice) M.S., CCC-SLP  Speech-Language Pathologist  [facility]  [phone]  [email or web address]  Pronouns: she/her/hers      *this report was created in part through the use of computerized dictation software, and though reviewed following completion, some typographic errors may persist.  If there is confusion regarding any of this notes contents, please contact me for clarification

## 2024-07-25 NOTE — PROGRESS NOTES
Order for CT scan of neck faxed to [facility] in [place]. Fax number [phone]. 4 [facility] faxed.

## 2024-07-25 NOTE — PATIENT INSTRUCTIONS
1.  You were seen in the ENT Clinic today by . If you have any questions or concerns after your appointment, please call [phone]. Press option #1 for scheduling related needs. Press option #3 for Nurse advice.    2.   has recommended  the following:   - CT neck with contrast to be done at [facility]. We will contact you with the results    3.  Plan is to return to clinic as needed      Lydia Sheth LPN  [phone]  [facility] - Otolaryngology

## 2024-08-06 ENCOUNTER — TRANSFERRED RECORDS (OUTPATIENT)
Dept: HEALTH INFORMATION MANAGEMENT | Facility: CLINIC | Age: 79
End: 2024-08-06
Payer: COMMERCIAL

## 2025-05-23 ENCOUNTER — OFFICE VISIT (OUTPATIENT)
Dept: PULMONOLOGY | Facility: CLINIC | Age: 80
End: 2025-05-23
Attending: INTERNAL MEDICINE
Payer: COMMERCIAL

## 2025-05-23 VITALS
SYSTOLIC BLOOD PRESSURE: 199 MMHG | HEART RATE: 72 BPM | HEIGHT: 65 IN | WEIGHT: 112.7 LBS | BODY MASS INDEX: 18.78 KG/M2 | OXYGEN SATURATION: 98 % | DIASTOLIC BLOOD PRESSURE: 103 MMHG

## 2025-05-23 DIAGNOSIS — R88.8 ABNORMAL SWEAT CHORIDE TEST WITHOUT DIAGNOSIS OF CYSTIC FIBROSIS: Primary | ICD-10-CM

## 2025-05-23 DIAGNOSIS — J34.89 RHINORRHEA: ICD-10-CM

## 2025-05-23 DIAGNOSIS — J47.9 BRONCHIECTASIS WITHOUT COMPLICATION (H): ICD-10-CM

## 2025-05-23 LAB
BACTERIA SPT CULT: NORMAL
GRAM STAIN RESULT: NORMAL

## 2025-05-23 PROCEDURE — G0463 HOSPITAL OUTPT CLINIC VISIT: HCPCS | Performed by: INTERNAL MEDICINE

## 2025-05-23 PROCEDURE — 87070 CULTURE OTHR SPECIMN AEROBIC: CPT | Performed by: INTERNAL MEDICINE

## 2025-05-23 PROCEDURE — 87116 MYCOBACTERIA CULTURE: CPT | Performed by: INTERNAL MEDICINE

## 2025-05-23 PROCEDURE — 1126F AMNT PAIN NOTED NONE PRSNT: CPT | Performed by: INTERNAL MEDICINE

## 2025-05-23 PROCEDURE — 99204 OFFICE O/P NEW MOD 45 MIN: CPT | Mod: 25 | Performed by: INTERNAL MEDICINE

## 2025-05-23 PROCEDURE — 3080F DIAST BP >= 90 MM HG: CPT | Performed by: INTERNAL MEDICINE

## 2025-05-23 PROCEDURE — 3077F SYST BP >= 140 MM HG: CPT | Performed by: INTERNAL MEDICINE

## 2025-05-23 RX ORDER — CHOLECALCIFEROL (VITAMIN D3) 50 MCG
TABLET ORAL
COMMUNITY
Start: 2025-04-15

## 2025-05-23 RX ORDER — AZELASTINE 1 MG/ML
1 SPRAY, METERED NASAL 2 TIMES DAILY
Qty: 30 ML | Refills: 5 | Status: SHIPPED | OUTPATIENT
Start: 2025-05-23

## 2025-05-23 RX ORDER — SODIUM CHLORIDE FOR INHALATION 7 %
4 VIAL, NEBULIZER (ML) INHALATION
COMMUNITY
Start: 2024-07-01

## 2025-05-23 ASSESSMENT — PAIN SCALES - GENERAL: PAINLEVEL_OUTOF10: NO PAIN (0)

## 2025-05-23 NOTE — PROGRESS NOTES
Pulmonary Medicine  Initial Consultation  May 23, 2025      Patient: Soraida Deshpande  MRN: [id number]  : 1945 (age 80 year old)  Primary Care Provider: Sherlyn Calix    Assessment & Plan:     Soraida Deshpande is a 80 y.o. former-smoking female with history of osteoarthritis, HTN, hypothyroidism, frequent respiratory infections presenting to establish care for bronchiectasis.     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 She remains with very mild symptoms, no courses of antibiotics needed in . Colonized with Pseudomonas, aspergillus, fusarium, Gord[place] sputi and Gord[place] bronchialis with additional h/o M abscessens on cultures. 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 She was treated with Levaquin among other antibiotics. Recent sputum testing has been negative for mycobacteria which speaks against ongoing nontuberculous mycobacterial pulmonary disease, but it will be medically reasonable to continue with active clinical and/or radiological surveillance. PFTs 25 within normal limits FVC 2.06, FEV1 1.77, FEV1/FVC  86%. Sweat chloride left 38, right 46. Genomic custom analysis thus far was negative/normal. IgM was low at 30 8/12/2024; the likelihood of the mild reduction in IgM contributing significantly to her symptoms was felt to be low in this situation. Can consider brenscatib pending FDA approval for this use.  - Vesting BID with nebs: 7% saline, albuterol neb PM and inhl AM; Aerobika BID  - Encouraged good hydration, and daily activities/exercise  - Seen by Immunology, Dr. 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  - Continue with lifestyle modifications for possible GERD with the hope to prevent some laryngeal and upper airway irritation  - Continue to follow weight loss, improving  - CF sputum culture pending  - Respiratory panel pending  - Continue expectant management for Mycobacterium abscessus  - Trial azelastine for rhinorrhea, ipratoprium 2nd line  - Will ask the genetic counselor if there is a more comprehensive CFTR genetic testing/ENac that should be performed; pending response may likely place this referral   - Pt to submit autoimmune testing already performed via [employer] to determine any additional workup needed  - Pt to follow with PCP for BP management  - Imaging discs to be uploaded    # RHM  - Covid booster, RSV (if has never had this), tetanus (last in 2014 per our records)    RTC 3 months. if you become sick then increase the frequency and reach out to your care team    The patient was seen and case discussed with attending physician, Dr. Gianna MD.  Keaton Harry MD PGY-2 Internal Medicine     Chief Complaint:     Establish care, recurrent infections    History of Present Illness:     History obtained from the patient. See patient provided notes document in Media tab for additional history.    80 y.o. former-smoking female with history of osteoarthritis, high cholesterol, frequent respiratory infections, presenting to establish care for  bronchiectasis. Previously worked as hospital .    Per the Pt, in May 2023 she sounded wheezy on auscultation and received a CXR then got a CT. Diagnosed with walking PNA and received abx for atypical PNA. Came back to [place] and went for follow up. Received follow up CT and started having fatigue at that time. Got another round of antibiotics after the CT continued to demonstrate findings. Some weeks later she went to [facility] and realized this could be chronic. Went to [place] for the winter, got a bronchoscopy while there. She was told there was a lot of mucous. Culture from the bronch revealed pseudomonas and M abscessus. She took a 10 day course of antibiotics for the pseudomonas. Transferred care and attended bronchiectasis clinic.  Splits time between [place] and [place]. Went to [place]. Was recommended to see an immunologist but the [place] department dissolved. Underwent testing regarding immune system and was told she doesn't need IgM supplementation as she responds to vaccines and based on the testing. She does need to stay up to date on vaccinations.    Never had bronchitis, allergies, asthma. Endorses post-nasal drip. Never had PNA until 2023. After several rounds of antibiotics, improved her airway clearance routine and has not had any infections in 2024 due to this. Does endorse some throat issues, globus sensation. Has been seen by GI and adopted lifestyle modifications to prevent aspiration associated with GERD. Changed water heaters to 135 degrees, is using filtered water with Life Straw for MTM, had monometry and pH testing. Even though she has higher than normal levels of weak acid reflux and no episodes of non acid reflux and lower than average episodes of acid reflux, maintains the GERD precautions. Has been following diet for silent reflux, sleeps with head elevated, no tomatoes, wine, chocolate. Has undergone 3 swallow tests without issues.     Post nasal drip- follows with ENT, has seen two and  neither thought she had a lot of silent reflux. Used to take Zyrtec which did help a little. Stopped due to trouble getting sputum up as it dried her out. Reports drippy nose and watery/dry eyes. Used to use Ipratropium nasal spray. Has globus feeling. 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 Didn't do much voice therapy, 3 sessions.    Airway clearance- Aerobika, 7% NS, albuterol inhl in AM, albuterol neb in PM. Postural drainage in PM with vest on. Success with sputum clearance mostly with PM session. Previous fatigue has improved - can walk for an hour/40 minutes and now okay, still has some SOB.     Weight went from 127 to 100, up to 109-112. Doesn't eat as much as she used to. Feels dietary changes for GERD may have affected weight. States she may not drink enough water.    Sister also has bronchiectasis. Pt has used Flonase and didn't tolerate it.    Hospitalizations over the last year: no.  Oxygen: no.  Inhalers: albuterol MDI AM and nebulized PM, 7% saline twice/day, aerobika twice/day, airway clearance betina twice/day  Compliance: good.  Number of respiratory exacerbations over the last year: 0. Did not require steroids. Did not require antibiotics.     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 She was treated with Levaquin among other antibiotics.     She has been told she has wheezing on auscultation, but none that she can hear. Sometimes she can hear crackles.  She does not have leg swelling.  She doesn't have orthopnea.  She does not have night sweats.   She has fatigue which has improved.  Constipation/Diarrhea- constipation on and off with changing thyroid medication- Thyroid removed in 2007  No history of diabetes or pancreatitis  Muscle/joint pain - OA hands, R shoulder, spine, neck    Additional HPI:  Recently seen July 18, 2024, for bronchiectasis, laryngopharyngeal reflux, and IgM deficiency. She has been followed also by Dr. Fisher  Julian in the Pulmonary Clinic at the [facility] in [place] that included a second bronchoscopic testing. At that time she was advised to follow sputum induction results, including 3 sputum testing for mycobacteria, to sort out to the question about whether or not she had any recurrent signs of Mycobacterium abscessus infection which was initially isolated in small amounts on 1 of her 2 prior bronchoscopies. Her sputum testing grew only 1 colony of Gordonia sputi and 1 colony of Gordonia bronchialis which were usually airway colonizers and not pathogenic. No other mycobacteria grew in these 3 samples that included sputum induction on July 18, 2024. These samples also grew a few amounts of Aspergillus ustus complex and a few Fusarium which are probably also airway colonizers since the smears were negative for fungus. The bacterial and Gram stain were unrevealing. She also underwent evaluation of her vocal cords by speech pathologist, Lilia Louis, and Dr. Mary Calderon at the [facility] in [place], and she was felt to have some signs of airway and laryngeal irritation with signs of bowing of the right vocal folds, and left vocal fold was not fully seen due to a left ventricle fold obscuring view according to the report from Lilia Lousi on July 25, 2024. She was offered to have speech therapy, but she decided to not pursue this treatment since she feels that she is doing well with active airway clearance that includes the use of albuterol inhaler, followed by nebulized 7% saline after trying 3% solution with good tolerance, and the use of Aerobika twice daily. She also does postural drainage that she feels is very helpful, especially in the afternoon after completion of her second nebulized treatments and includes lee maneuver after positioning her foot up on her bed. She also is doing 40 minutes of walking and deep breathing exercise with good exercise tolerance and feels that her mild  dyspnea on exertion has improved which her  also confirmed today. She continues having some fatigue but feels that she is doing well with her current treatment that also includes treatment for GERD with head elevation during sleep, dietary modifications, and the use of famotidine.     Dr. Kenney Chaudhary from [facility] who evaluated her case on August 12, 2024. Her low IgM levels were evaluated as well as some evidence of gammopathy and was recommended to have followup assessment of these abnormalities in 6 months. She was noted also to have adequate pneumococcal vaccine response and felt not to be a candidate for immunoglobulin replacement therapy.    She also underwent evaluation with Jason Loaiza in [facility] and received counseling for genetic testing for bronchiectasis which was unrevealing on October 20, 2024.    She also underwent a CT of neck soft tissues with contrast on August 6, 2024, which was reviewed by Dr. 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 There were also signs of cervical spondylosis and rightward septal defect and spurring incidentally noted.    Review of Systems:     Complete ROS negative except as noted in HPI.    Medical and Surgical History:     Past Medical History:   Diagnosis Date    Absence of menstruation     postmenopausal    Closed fracture of shaft of humerus     Urinary tract infection, site not specified     recurrent     Past Surgical History:   Procedure Laterality Date    NO HISTORY OF SURGERY       Social and Family History:     Social History     Socioeconomic History    Marital status:      Spouse name: Not on file    Number of children: Not on file    Years of education: Not on file    Highest education level: Not on file   Occupational History    Not on file   Tobacco Use    Smoking status: Former      Current packs/day: 0.00     Types: Cigarettes     Quit date: 1975     Years since quittin.5    Smokeless tobacco: Never    Tobacco comments:     quit at age 30 after smoking for 6 years at 1 ppd   Substance and Sexual Activity    Alcohol use: Yes     Comment: 7-10 per week    Drug use: No    Sexual activity: Not on file   Other Topics Concern    Not on file   Social History Narrative    Not on file     Social Drivers of Health     Financial Resource Strain: Not on file   Food Insecurity: No Food Insecurity (2024)    Received from [facility]    Hunger Vital Sign     Worried About Running Out of Food in the Last Year: Never true     Ran Out of Food in the Last Year: Never true   Transportation Needs: No Transportation Needs (2024)    Received from [facility]    PRAPARE - Transportation     Lack of Transportation (Medical): No     Lack of Transportation (Non-Medical): No   Physical Activity: Sufficiently Active (2024)    Received from [facility]    Exercise Vital Sign     Days of Exercise per Week: 7 days     Minutes of Exercise per Session: 40 min   Stress: Not on file   Social Connections: Unknown (2024)    Received from [facility] Short Social Needs Screening - Social Connection     Would you like help with any of the following needs: food, medicine/medical supplies, transportation, loneliness, housing or utilities?: Not on file   Interpersonal Safety: Not on file   Housing Stability: Low Risk  (2024)    Received from [facility]    Housing Stability     What is your living situation today?: I have a steady place to live     Family History   Problem Relation Age of Onset    C.A.D. Father         first MI in his 40's    Cerebrovascular Disease Father     Osteoporosis Mother     Cardiovascular Mother         carotid endarterectomy, pacemaker    Hypertension Mother     Breast Cancer Paternal Aunt     Cancer - colorectal Maternal Aunt      Allergies and Home Medications:  "    Allergies   Allergen Reactions    Nickel Rash    Bacitracin-Polymyxin B Rash    Cephalexin Other (See Comments)     Other reaction(s): other   Mouth sores    Mouth sores    Cobalt Rash    Nitrofurantoin Nausea and Vomiting and Other (See Comments)     Per patient, abdominal pain    Sulfamethoxazole-Trimethoprim Rash     Allergic to the proloprim ingredient    Trimethoprim Rash     Proloprim    Dave-Bacit-Poly-Lidocaine Rash    Efrwr-Ojyug-Omnkacv-Pramoxine Rash    Neomycin-Bacitracin Zn-Polymyx Rash    Risedronate Other (See Comments) and Rash     (Not in a hospital admission)    Physical Exam:     Vital signs:      BP: (!) 199/103 Pulse: 72     SpO2: 98 % (RA)     Height: 165.1 cm (5' 5\") Weight: 51.1 kg (112 lb 11.2 oz)  I/O: [unfilled]  Constitutional: Thin, NAD  HEENT: Eyes with pink conjunctivae, anicteric. Oral mucosa moist without lesions or polyps.  PULM: Good air flow bilaterally. Scattered crackles, no rhonchi, no wheezes. Non-labored breathing on room air.  CV: Normal S1 and S2. RRR. No murmur, gallop, or rub. No peripheral edema.   ABD: NABS, soft, nontender, nondistended.    MSK: Moves all extremities. No apparent muscle wasting.   NEURO: Alert and conversant.   SKIN: Warm, dry. No rash on limited exam.   PSYCH: Mood stable.     Data:     LABS    4/8/2024: Low IgM at 32    Sweat chloride test: 38 left, 46 right    Kappy/Lambda FLC Ration 1.79    Aspergillus IgE, ab negative      Sputum cultures:     1/10/2024: BAL   10/23/2023: BAL. Mycobacterium abscessus. Pansensitive Pseudomonas      Most Recent [facility] Pulmonary Function Testing (FVC/FEV1 only)      PFTs on 12/21/2023: Normal PFTs without bronchodilator response.    IMAGING    Discs brought to clinic to be uploaded.  "

## 2025-05-23 NOTE — PATIENT INSTRUCTIONS
Self-Care Plan    RECOMMENDATIONS:   Soraida,     It was very nice meeting you. We reviewed your lung function, symptoms, and management plan. We discussed the following:   - start astelin nasal spray twice a day; if you continue to have some nasal drainage you can also use ipratropium nasal spray 1-2x daily IN ADDITION  - continue twice daily vesting with nebs/aerobika; if you become sick then increase the frequency and reach out to your care team  - please send the results of your autoimmune panel via [email or web address]  - we will upload your imaging studies and have the discs mailed back to you   - airway clearance education today with Carla respiratory therapist   - you are due for the following vaccines: covid booster, RSV (if you have never had this), tetanus (last in 2014 per our records)  - your genetic analysis panel did include CFTR but we will ask the genetic counselor if there is a more comprehensive CFTR genetic test that should be performed; once we hear back we will place this referral   - latest clinical trial on a new non-CF bronchiectasis medication (brensocatib): [email or web address]    YOUR GOAL:  Have a good spring!       Cystic Fibrosis :    Faviola Schneider   [phone]  [facility] Nurse line:  Sebastián Loredo  [phone]     [facility] Fax Number:      [phone]         Cystic Fibrosis Respiratory Therapists:   Kim Smyth                          [phone]          Carla Diego   [phone]  Cystic Fibrosis Pharmacists:           Kendal Patel                              [phone]         Lilia Lma      [phone]   Cystic Fibrosis Genetic Counselor:   Bethanie Ibarra    [phone]    [facility] website:  [email or web address]       MRN: [id number]   Clinic Date: May 23, 2025   Patient: Soraida Deshpande     Annual Studies:   No results found for:  "\"IGG\"  No results found for: \"INS\"  There are no preventive care reminders to display for this patient.    Pulmonary Function Tests  FEV1: amount of air you can blow out in 1 second  FVC: total amount of air you can take in and blow out    Your Goals:             Latest Ref Rng & Units 5/23/2025    12:25 PM   PFT   FVC L 2.06  P   FEV1 L 1.77  P   FVC% % 82  P   FEV1% % 92  P      P Preliminary result          Airway Clearance: The Most Important Way to Keep Your Lungs Healthy  Vest Settings:   Hill-Rom Frequencies: 8, 9, 10 Pressure 10 Then, Frequencies 18, 19, 20 Pressure 6     RespirTech: Quick Start with Pressure of     Do each frequency for 5 minutes; Deflate vest after each frequency & cough 3 times before beginning the next setting.    Vest and Neb Therapy should be done 2 times/day.    Good Nutrition Can Improve Lung Function and Overall Health    Take ALL of your vitamins with food    Take 1/2 of your enzymes before EVERY meal/snack and the other 1/2 mid-meal/snack    Wt Readings from Last 3 Encounters:   05/23/25 51.1 kg (112 lb 11.2 oz)   07/25/24 48.5 kg (107 lb)   11/22/05 54 kg (119 lb)       Body mass index is 18.75 kg/m .             Controlling Blood Sugars Helps Prevent Lung Infections & Improves Nutrition  Test blood sugar:    In the morning before eating (goal is )    2 hours after a meal (goal is less than 150)    When pre-meal glucose is greater than 150 add correction    At bedtime (if less than 100 eat a snack with 15 grams of carbohydrates  Last A1C Results: No results found for: \"A1C\"      If diabetic, measure A1C every 6 months. Goal: Under 7%                 "

## 2025-05-23 NOTE — LETTER
2025      Soraida Deshpande  [address] [facility] [address]      Dear Colleague,    Thank you for referring your patient, Soraida Deshpande, to the [facility] AND [facility]. Please see a copy of my visit note below.        Pulmonary Medicine  Initial Consultation  May 23, 2025      Patient: Soraida Deshpande  MRN: [id number]  : 1945 (age 80 year old)  Primary Care Provider: Sherlyn Calix    Assessment & Plan:     Soraida Deshpande is a 80 y.o. former-smoking female with history of osteoarthritis, HTN, hypothyroidism, frequent respiratory infections presenting to establish care for bronchiectasis.     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 She remains with very mild symptoms, no courses of antibiotics needed in . Colonized with Pseudomonas, aspergillus, fusarium, Gord[place] sputi and G[place] bronchialis with additional h/o M abscessens on cultures. 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 She was treated with Levaquin among other antibiotics. Recent sputum testing has  been negative for mycobacteria which speaks against ongoing nontuberculous mycobacterial pulmonary disease, but it will be medically reasonable to continue with active clinical and/or radiological surveillance. PFTs 5/23/25 within normal limits FVC 2.06, FEV1 1.77, FEV1/FVC 86%. Sweat chloride left 38, right 46. Genomic custom analysis thus far was negative/normal. IgM was low at 30 8/12/2024; the likelihood of the mild reduction in IgM contributing significantly to her symptoms was felt to be low in this situation. Can consider brenscatib pending FDA approval for this use.  - Vesting BID with nebs: 7% saline, albuterol neb PM and inhl AM; Aerobika BID  - Encouraged good hydration, and daily activities/exercise  - Seen by Immunology, Dr. 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  - Continue with lifestyle modifications for possible GERD with the hope to prevent some laryngeal and upper airway irritation  - Continue to follow weight loss, improving  - CF sputum culture pending  - Respiratory panel pending  - Continue expectant management for Mycobacterium abscessus  - Trial azelastine for rhinorrhea, ipratoprium 2nd line  - Will ask the genetic counselor if there is a more comprehensive CFTR genetic testing/ENac that should be performed; pending response may likely place this referral   - Pt to submit autoimmune testing already performed via [facility] to determine any additional workup needed  - Pt to follow with PCP for BP management  - Imaging discs to be uploaded    # RHM  - Covid booster, RSV (if has never had this), tetanus (last in 2014 per our records)    RTC 3 months. if you become sick then increase the frequency and reach out to your care team    The patient was seen and case discussed with attending physician, Dr. Gianna MD.  Keaton Harry MD PGY-2 Internal Medicine     Chief Complaint:     Establish care, recurrent infections    History of Present  Illness:     History obtained from the patient. See patient provided notes document in Media tab for additional history.    80 y.o. former-smoking female with history of osteoarthritis, high cholesterol, frequent respiratory infections, presenting to [facility] care for bronchiectasis. Previously worked as hospital .    Per the Pt, in May 2023 she sounded wheezy on auscultation and received a CXR then got a CT. Diagnosed with walking PNA and received abx for atypical PNA. Came back to [place] and went for follow up. Received follow up CT and started having fatigue at that time. Got another round of antibiotics after the CT continued to demonstrate findings. Some weeks later she went to [facility] and realized this could be chronic. Went to [place] for the winter, got a bronchoscopy while there. She was told there was a lot of mucous. Culture from the bronch revealed pseudomonas and M abscessus. She took a 10 day course of antibiotics for the pseudomonas. Transferred care and attended bronchiectasis clinic.  Splits time between [place] and [place]. Went to [place]. Was recommended to see an immunologist but the [place] department dissolved. Underwent testing regarding immune system and was told she doesn't need IgM supplementation as she responds to vaccines and based on the testing. She does need to stay up to date on vaccinations.    Never had bronchitis, allergies, asthma. Endorses post-nasal drip. Never had PNA until 2023. After several rounds of antibiotics, improved her airway clearance routine and has not had any infections in 2024 due to this. Does endorse some throat issues, globus sensation. Has been seen by GI and adopted lifestyle modifications to prevent aspiration associated with GERD. Changed water heaters to 135 degrees, is using filtered water with Life Straw for MTM, had monometry and pH testing. Even though she has higher than normal levels of weak acid reflux and no episodes of non acid reflux and lower than  average episodes of acid reflux, maintains the GERD precautions. Has been following diet for silent reflux, sleeps with head elevated, no tomatoes, wine, chocolate. Has undergone 3 swallow tests without issues.     Post nasal drip- follows with ENT, has seen two and neither thought she had a lot of silent reflux. Used to take Zyrtec which did help a little. Stopped due to trouble getting sputum up as it dried her out. Reports drippy nose and watery/dry eyes. Used to use Ipratropium nasal spray. Has globus feeling. 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 Didn't do much voice therapy, 3 sessions.    Airway clearance- Aerobika, 7% NS, albuterol inhl in AM, albuterol neb in PM. Postural drainage in PM with vest on. Success with sputum clearance mostly with PM session. Previous fatigue has improved - can walk for an hour/40 minutes and now okay, still has some SOB.     Weight went from 127 to 100, up to 109-112. Doesn't eat as much as she used to. Feels dietary changes for GERD may have affected weight. States she may not drink enough water.    Sister also has bronchiectasis. Pt has used Flonase and didn't tolerate it.    Hospitalizations over the last year: no.  Oxygen: no.  Inhalers: albuterol MDI AM and nebulized PM, 7% saline twice/day, aerobika twice/day, airway clearance betina twice/day  Compliance: good.  Number of respiratory exacerbations over the last year: 0. Did not require steroids. Did not require antibiotics.     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 She was treated with Levaquin among other antibiotics.     She has been told she has wheezing on auscultation, but none that she can hear. Sometimes she can hear crackles.  She does not have leg swelling.  She doesn't have orthopnea.  She does not have night sweats.   She has fatigue which has improved.  Constipation/Diarrhea- constipation on and off with changing thyroid medication- Thyroid removed in  2007  No history of diabetes or pancreatitis  Muscle/joint pain - OA hands, R shoulder, spine, neck    Additional HPI:  Recently seen July 18, 2024, for bronchiectasis, laryngopharyngeal reflux, and IgM deficiency. She has been followed also by Dr. Natalia Jordan in the Pulmonary Clinic at the [facility] in [place] that included a second bronchoscopic testing. At that time she was advised to follow sputum induction results, including 3 sputum testing for mycobacteria, to sort out to the question about whether or not she had any recurrent signs of Mycobacterium abscessus infection which was initially isolated in small amounts on 1 of her 2 prior bronchoscopies. Her sputum testing grew only 1 colony of Gordonia sputi and 1 colony of Gordonia bronchialis which were usually airway colonizers and not pathogenic. No other mycobacteria grew in these 3 samples that included sputum induction on July 18, 2024. These samples also grew a few amounts of Aspergillus ustus complex and a few Fusarium which are probably also airway colonizers since the smears were negative for fungus. The bacterial and Gram stain were unrevealing. She also underwent evaluation of her vocal cords by speech pathologist, Lilia Louis, and Dr. Mary Calderon at the [facility] in [place], and she was felt to have some signs of airway and laryngeal irritation with signs of bowing of the right vocal folds, and left vocal fold was not fully seen due to a left ventricle fold obscuring view according to the report from Lilia Louis on July 25, 2024. She was offered to have speech therapy, but she decided to not pursue this treatment since she feels that she is doing well with active airway clearance that includes the use of albuterol inhaler, followed by nebulized 7% saline after trying 3% solution with good tolerance, and the use of Aerobika twice daily. She also does postural drainage that she feels is very helpful, especially in  the afternoon after completion of her second nebulized treatments and includes lee maneuver after positioning her foot up on her bed. She also is doing 40 minutes of walking and deep breathing exercise with good exercise tolerance and feels that her mild dyspnea on exertion has improved which her  also confirmed today. She continues having some fatigue but feels that she is doing well with her current treatment that also includes treatment for GERD with head elevation during sleep, dietary modifications, and the use of famotidine.     Dr. Kenney Chaudhary from [facility] who evaluated her case on August 12, 2024. Her low IgM levels were evaluated as well as some evidence of gammopathy and was recommended to have followup assessment of these abnormalities in 6 months. She was noted also to have adequate pneumococcal vaccine response and felt not to be a candidate for immunoglobulin replacement therapy.    She also underwent evaluation with Jason Loaiza in [facility] and received counseling for genetic testing for bronchiectasis which was unrevealing on October 20, 2024.    She also underwent a CT of neck soft tissues with contrast on August 6, 2024, which was reviewed by Dr. 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 There were also signs of cervical spondylosis and rightward septal defect and spurring incidentally noted.    Review of Systems:     Complete ROS negative except as noted in HPI.    Medical and Surgical History:     Past Medical History:   Diagnosis Date     Absence of menstruation     postmenopausal     Closed fracture of shaft of humerus      Urinary tract infection, site not specified     recurrent     Past Surgical History:   Procedure Laterality Date     NO HISTORY OF SURGERY       Social and Family History:     Social History      Socioeconomic History     Marital status:      Spouse name: Not on file     Number of children: Not on file     Years of education: Not on file     Highest education level: Not on file   Occupational History     Not on file   Tobacco Use     Smoking status: Former     Current packs/day: 0.00     Types: Cigarettes     Quit date: 1975     Years since quittin.5     Smokeless tobacco: Never     Tobacco comments:     quit at age 30 after smoking for 6 years at 1 ppd   Substance and Sexual Activity     Alcohol use: Yes     Comment: 7-10 per week     Drug use: No     Sexual activity: Not on file   Other Topics Concern     Not on file   Social History Narrative     Not on file     Social Drivers of Health     Financial Resource Strain: Not on file   Food Insecurity: No Food Insecurity (2024)    Received from [facility]    Hunger Vital Sign      Worried About Running Out of Food in the Last Year: Never true      Ran Out of Food in the Last Year: Never true   Transportation Needs: No Transportation Needs (2024)    Received from [facility]    PRAPARE - Transportation      Lack of Transportation (Medical): No      Lack of Transportation (Non-Medical): No   Physical Activity: Sufficiently Active (2024)    Received from [facility]    Exercise Vital Sign      Days of Exercise per Week: 7 days      Minutes of Exercise per Session: 40 min   Stress: Not on file   Social Connections: Unknown (2024)    Received from [facility] Short Social Needs Screening - Social Connection      Would you like help with any of the following needs: food, medicine/medical supplies, transportation, loneliness, housing or utilities?: Not on file   Interpersonal Safety: Not on file   Housing Stability: Low Risk  (2024)    Received from [facility]    Housing Stability      What is your living situation today?: I have a steady place to live     Family History   Problem Relation Age of Onset      "C.A.D. Father         first MI in his 40's     Cerebrovascular Disease Father      Osteoporosis Mother      Cardiovascular Mother         carotid endarterectomy, pacemaker     Hypertension Mother      Breast Cancer Paternal Aunt      Cancer - colorectal Maternal Aunt      Allergies and Home Medications:     Allergies   Allergen Reactions     Nickel Rash     Bacitracin-Polymyxin B Rash     Cephalexin Other (See Comments)     Other reaction(s): other   Mouth sores    Mouth sores     Cobalt Rash     Nitrofurantoin Nausea and Vomiting and Other (See Comments)     Per patient, abdominal pain     Sulfamethoxazole-Trimethoprim Rash     Allergic to the proloprim ingredient     Trimethoprim Rash     Proloprim     Dave-Bacit-Poly-Lidocaine Rash     Gfulb-Kzhrf-Jqzbren-Pramoxine Rash     Neomycin-Bacitracin Zn-Polymyx Rash     Risedronate Other (See Comments) and Rash     (Not in a hospital admission)    Physical Exam:     Vital signs:      BP: (!) 199/103 Pulse: 72     SpO2: 98 % (RA)     Height: 165.1 cm (5' 5\") Weight: 51.1 kg (112 lb 11.2 oz)  I/O: [unfilled]  Constitutional: Thin, NAD  HEENT: Eyes with pink conjunctivae, anicteric. Oral mucosa moist without lesions or polyps.  PULM: Good air flow bilaterally. Scattered crackles, no rhonchi, no wheezes. Non-labored breathing on room air.  CV: Normal S1 and S2. RRR. No murmur, gallop, or rub. No peripheral edema.   ABD: NABS, soft, nontender, nondistended.    MSK: Moves all extremities. No apparent muscle wasting.   NEURO: Alert and conversant.   SKIN: Warm, dry. No rash on limited exam.   PSYCH: Mood stable.     Data:     LABS    4/8/2024: Low IgM at 32    Sweat chloride test: 38 left, 46 right    Kappy/Lambda FLC Ration 1.79    Aspergillus IgE, ab negative      Sputum cultures:     1/10/2024: BAL   10/23/2023: BAL. Mycobacterium abscessus. Pansensitive Pseudomonas      Most Recent [facility] Pulmonary Function Testing (FVC/FEV1 only)      PFTs on 12/21/2023: Normal PFTs without " bronchodilator response.    IMAGING    Discs brought to clinic to be uploaded.    Attestation signed by Rocio Katz MD at 5/26/2025 10:13 AM:  Physician Attestation   I, Rocio Katz MD, saw this patient with the resident and agree with the resident's findings and plan of care as documented in the note.      I personally reviewed vital signs, medications, relevant images, and labs    Soraida is a 79 yo F with recent diagnosis of bronchiectasis, unclear etiology at this time but she has grown NTM previously. Will further evaluate including for possible AI etiology. Sweat chloride test was intermediate but only screening CFTR panel was sent which was negative. Will discuss sending full panel with GCs given her sister was also recently diagnosed with bronchiectasis. Will optimize airway clearance and she met with RT Carla today for education. Imaging studies will be scanned in for personal review. She has not developed frequent exacerbations (0 over the last year) but would consider inhaled antimicrobial if this were to be the case. Education on management provided today with  at patient side. PFTs today wnl and acceptable. She previously grew P. Aeruginosa, Aspergillus, M. Abscessus, Fusarium, cultures currently pending. AI work-up performed at OSH and she will send us these results for review.     The patient was seen and examined with the resident/fellow physician.  We have discussed the patient in detail and I agree with the findings, assessment, and plan as documented when this note was cosigned on this day.       Rocio Katz MD  Date of Service (when I saw the patient): 5/23/25      Again, thank you for allowing me to participate in the care of your patient.        Sincerely,        Rocio Katz MD    Electronically signed

## 2025-05-24 ENCOUNTER — MYC MEDICAL ADVICE (OUTPATIENT)
Dept: PULMONOLOGY | Facility: CLINIC | Age: 80
End: 2025-05-24
Payer: COMMERCIAL

## 2025-05-25 LAB
ACID FAST STAIN (ARUP): NORMAL
ACID FAST STAIN (ARUP): NORMAL

## 2025-05-28 ENCOUNTER — LAB (OUTPATIENT)
Dept: LAB | Facility: CLINIC | Age: 80
End: 2025-05-28
Payer: COMMERCIAL

## 2025-05-28 DIAGNOSIS — J47.9 BRONCHIECTASIS WITHOUT COMPLICATION (H): ICD-10-CM

## 2025-05-28 DIAGNOSIS — R88.8 ABNORMAL SWEAT CHORIDE TEST WITHOUT DIAGNOSIS OF CYSTIC FIBROSIS: ICD-10-CM

## 2025-05-28 LAB
BACTERIA SPEC CULT: NORMAL
CRP SERPL-MCNC: <3 MG/L
ERYTHROCYTE [SEDIMENTATION RATE] IN BLOOD BY WESTERGREN METHOD: 15 MM/HR (ref 0–30)

## 2025-05-28 NOTE — PROGRESS NOTES
RT Note:    Met with patient today per provider request to assist patient in optimizing her airway clearance routine. Patient currently has an Afflovest which she received in January per previous provider's orders. Patient brought Afflovest with to clinic today and it appears to be a good fit for the patient. Both provider and RT feel that this is an appropriate fit and device for the patient. Patient also uses the Aerobika, autogenic drainage, and postural drainage to augment her airway clearance. Patient has a good understanding of the different airway clearance techniques, when/how to use, and order of inhaled medications. RT will continue to follow for any airway clearance needs.

## 2025-05-29 ENCOUNTER — TELEPHONE (OUTPATIENT)
Dept: PULMONOLOGY | Facility: CLINIC | Age: 80
End: 2025-05-29
Payer: COMMERCIAL

## 2025-05-29 LAB
ANA SER QL IF: NEGATIVE
ENA JO1 AB SER IA-ACNC: <0.5 U/ML
ENA JO1 IGG SER-ACNC: NEGATIVE
RHEUMATOID FACT SERPL-ACNC: 22 IU/ML

## 2025-05-29 NOTE — TELEPHONE ENCOUNTER
Action May 29, 2025 HELEN 9:23 AM   Action Taken Imaging discs (7) from patient were uploaded and resolved in PACS. Discs mailed to patient via [employer].   Tracking #[id number]

## 2025-06-03 ENCOUNTER — LAB (OUTPATIENT)
Dept: LAB | Facility: CLINIC | Age: 80
End: 2025-06-03
Payer: COMMERCIAL

## 2025-06-03 ENCOUNTER — RESULTS FOLLOW-UP (OUTPATIENT)
Dept: MULTI SPECIALTY CLINIC | Facility: CLINIC | Age: 80
End: 2025-06-03

## 2025-06-03 DIAGNOSIS — R88.8 ABNORMAL SWEAT CHORIDE TEST WITHOUT DIAGNOSIS OF CYSTIC FIBROSIS: ICD-10-CM

## 2025-06-03 DIAGNOSIS — R76.8 ELEVATED RHEUMATOID FACTOR: ICD-10-CM

## 2025-06-03 DIAGNOSIS — J47.9 BRONCHIECTASIS WITHOUT COMPLICATION (H): ICD-10-CM

## 2025-06-03 DIAGNOSIS — J47.9 BRONCHIECTASIS (H): Primary | ICD-10-CM

## 2025-06-03 LAB
BACTERIA SPEC CULT: NORMAL
GRAM STAIN RESULT: NORMAL

## 2025-06-05 ENCOUNTER — PATIENT OUTREACH (OUTPATIENT)
Dept: CARE COORDINATION | Facility: CLINIC | Age: 80
End: 2025-06-05
Payer: COMMERCIAL

## 2025-06-05 LAB
BACTERIA SPT CULT: NORMAL
ENA SS-B IGG SER IA-ACNC: <0.6 U/ML
ENA SS-B IGG SER IA-ACNC: NEGATIVE

## 2025-06-14 ENCOUNTER — HEALTH MAINTENANCE LETTER (OUTPATIENT)
Age: 80
End: 2025-06-14

## 2025-06-17 ENCOUNTER — MYC MEDICAL ADVICE (OUTPATIENT)
Dept: PULMONOLOGY | Facility: CLINIC | Age: 80
End: 2025-06-17
Payer: COMMERCIAL

## 2025-06-17 DIAGNOSIS — J47.1 BRONCHIECTASIS WITH ACUTE EXACERBATION (H): Primary | ICD-10-CM

## 2025-06-17 RX ORDER — CIPROFLOXACIN 500 MG/1
500 TABLET, FILM COATED ORAL 2 TIMES DAILY
Qty: 20 TABLET | Refills: 0 | Status: SHIPPED | OUTPATIENT
Start: 2025-06-17 | End: 2025-06-27

## 2025-06-17 NOTE — TELEPHONE ENCOUNTER
Spoke with Dr. Katz regarding Soraida's symptoms.    Dr Katz recommends that patient increases her AWC up to 3-4 times per day. If she finds that her symptoms continue to improve then she does not need to take antibiotics. If symptoms worsen or do not improve Dr. Katz recommends Ciprofloxacin 500mg BID for 10 days. Dr. Katz recommends sending abx to pharmacy now should patient end up needing it.    Called patient and relayed provider recommendation. Soraida verbalizes understanding and agrees with plan. She will call us back if symptoms worsen.

## 2025-07-01 LAB — BACTERIA SPT CULT: ABNORMAL

## 2025-08-04 ENCOUNTER — LAB REQUISITION (OUTPATIENT)
Dept: LAB | Facility: CLINIC | Age: 80
End: 2025-08-04
Payer: COMMERCIAL

## 2025-08-04 DIAGNOSIS — Z90.89 ACQUIRED ABSENCE OF OTHER ORGANS: ICD-10-CM

## 2025-08-04 DIAGNOSIS — Z98.890 OTHER SPECIFIED POSTPROCEDURAL STATES: ICD-10-CM

## 2025-08-05 ENCOUNTER — TELEPHONE (OUTPATIENT)
Dept: PULMONOLOGY | Facility: CLINIC | Age: 80
End: 2025-08-05

## 2025-08-05 ENCOUNTER — ANCILLARY PROCEDURE (OUTPATIENT)
Dept: GENERAL RADIOLOGY | Facility: CLINIC | Age: 80
End: 2025-08-05
Attending: STUDENT IN AN ORGANIZED HEALTH CARE EDUCATION/TRAINING PROGRAM
Payer: COMMERCIAL

## 2025-08-05 ENCOUNTER — OFFICE VISIT (OUTPATIENT)
Dept: RHEUMATOLOGY | Facility: CLINIC | Age: 80
End: 2025-08-05
Attending: STUDENT IN AN ORGANIZED HEALTH CARE EDUCATION/TRAINING PROGRAM
Payer: COMMERCIAL

## 2025-08-05 VITALS
OXYGEN SATURATION: 100 % | RESPIRATION RATE: 18 BRPM | SYSTOLIC BLOOD PRESSURE: 172 MMHG | HEART RATE: 72 BPM | WEIGHT: 112 LBS | DIASTOLIC BLOOD PRESSURE: 89 MMHG | TEMPERATURE: 97.6 F | BODY MASS INDEX: 18.64 KG/M2

## 2025-08-05 DIAGNOSIS — R76.8 ELEVATED RHEUMATOID FACTOR: Primary | ICD-10-CM

## 2025-08-05 DIAGNOSIS — R76.8 ELEVATED RHEUMATOID FACTOR: ICD-10-CM

## 2025-08-05 LAB
T4 FREE SERPL-MCNC: 1.54 NG/DL (ref 0.9–1.7)
TSH SERPL DL<=0.005 MIU/L-ACNC: 1.76 UIU/ML (ref 0.3–4.2)

## 2025-08-05 PROCEDURE — 73130 X-RAY EXAM OF HAND: CPT | Mod: GC | Performed by: RADIOLOGY

## 2025-08-05 ASSESSMENT — PAIN SCALES - GENERAL: PAINLEVEL_OUTOF10: MILD PAIN (1)

## 2025-08-20 ENCOUNTER — OFFICE VISIT (OUTPATIENT)
Dept: PULMONOLOGY | Facility: CLINIC | Age: 80
End: 2025-08-20
Attending: INTERNAL MEDICINE
Payer: COMMERCIAL

## 2025-08-20 VITALS
BODY MASS INDEX: 18.78 KG/M2 | DIASTOLIC BLOOD PRESSURE: 89 MMHG | HEIGHT: 65 IN | WEIGHT: 112.7 LBS | HEART RATE: 100 BPM | OXYGEN SATURATION: 96 % | SYSTOLIC BLOOD PRESSURE: 161 MMHG

## 2025-08-20 DIAGNOSIS — J47.9 BRONCHIECTASIS WITHOUT COMPLICATION (H): Primary | ICD-10-CM

## 2025-08-20 DIAGNOSIS — J34.89 RHINORRHEA: ICD-10-CM

## 2025-08-20 DIAGNOSIS — R88.8 ABNORMAL SWEAT CHORIDE TEST WITHOUT DIAGNOSIS OF CYSTIC FIBROSIS: ICD-10-CM

## 2025-08-20 DIAGNOSIS — B34.9 VIRAL INFECTION: ICD-10-CM

## 2025-08-20 LAB
C PNEUM DNA SPEC QL NAA+PROBE: NOT DETECTED
EXPTIME-PRE: 7.44 SEC
FEF2575-%PRED-PRE: 102 %
FEF2575-PRE: 1.59 L/SEC
FEF2575-PRED: 1.55 L/SEC
FEFMAX-%PRED-PRE: 120 %
FEFMAX-PRE: 6.18 L/SEC
FEFMAX-PRED: 5.12 L/SEC
FEV1-%PRED-PRE: 87 %
FEV1-PRE: 1.8 L
FEV1FEV6-PRE: 80 %
FEV1FEV6-PRED: 78 %
FEV1FVC-PRE: 78 %
FEV1FVC-PRED: 78 %
FEV1SVC-PRED: 75 L
FIFMAX-PRE: 4.65 L/SEC
FLUAV H1 2009 PAND RNA SPEC QL NAA+PROBE: NOT DETECTED
FLUAV H1 RNA SPEC QL NAA+PROBE: NOT DETECTED
FLUAV H3 RNA SPEC QL NAA+PROBE: NOT DETECTED
FLUAV RNA SPEC QL NAA+PROBE: NEGATIVE
FLUAV RNA SPEC QL NAA+PROBE: NOT DETECTED
FLUBV RNA RESP QL NAA+PROBE: NEGATIVE
FLUBV RNA SPEC QL NAA+PROBE: NOT DETECTED
FVC-%PRED-PRE: 85 %
FVC-PRE: 2.32 L
FVC-PRED: 2.72 L
HADV DNA SPEC QL NAA+PROBE: NOT DETECTED
HCOV PNL SPEC NAA+PROBE: NOT DETECTED
HMPV RNA SPEC QL NAA+PROBE: DETECTED
HPIV1 RNA SPEC QL NAA+PROBE: NOT DETECTED
HPIV2 RNA SPEC QL NAA+PROBE: NOT DETECTED
HPIV3 RNA SPEC QL NAA+PROBE: NOT DETECTED
HPIV4 RNA SPEC QL NAA+PROBE: NOT DETECTED
Lab: 99 %
M PNEUMO DNA SPEC QL NAA+PROBE: NOT DETECTED
RSV RNA SPEC NAA+PROBE: NEGATIVE
RSV RNA SPEC QL NAA+PROBE: NOT DETECTED
RSV RNA SPEC QL NAA+PROBE: NOT DETECTED
RV+EV RNA SPEC QL NAA+PROBE: NOT DETECTED
SARS-COV-2 RNA RESP QL NAA+PROBE: NEGATIVE

## 2025-08-20 PROCEDURE — G0463 HOSPITAL OUTPT CLINIC VISIT: HCPCS | Performed by: INTERNAL MEDICINE

## 2025-08-20 PROCEDURE — 1126F AMNT PAIN NOTED NONE PRSNT: CPT | Performed by: INTERNAL MEDICINE

## 2025-08-20 PROCEDURE — 3079F DIAST BP 80-89 MM HG: CPT | Performed by: INTERNAL MEDICINE

## 2025-08-20 PROCEDURE — 87205 SMEAR GRAM STAIN: CPT | Performed by: INTERNAL MEDICINE

## 2025-08-20 PROCEDURE — 94375 RESPIRATORY FLOW VOLUME LOOP: CPT | Performed by: INTERNAL MEDICINE

## 2025-08-20 PROCEDURE — 99215 OFFICE O/P EST HI 40 MIN: CPT | Mod: 25 | Performed by: INTERNAL MEDICINE

## 2025-08-20 PROCEDURE — 3077F SYST BP >= 140 MM HG: CPT | Performed by: INTERNAL MEDICINE

## 2025-08-20 PROCEDURE — 87633 RESP VIRUS 12-25 TARGETS: CPT | Performed by: INTERNAL MEDICINE

## 2025-08-20 PROCEDURE — 87637 SARSCOV2&INF A&B&RSV AMP PRB: CPT | Performed by: INTERNAL MEDICINE

## 2025-08-20 RX ORDER — ALBUTEROL SULFATE 0.83 MG/ML
2.5 SOLUTION RESPIRATORY (INHALATION) EVERY 6 HOURS PRN
COMMUNITY

## 2025-08-20 RX ORDER — FLUTICASONE PROPIONATE 50 MCG
1 SPRAY, SUSPENSION (ML) NASAL DAILY
Qty: 16 G | Refills: 5 | Status: SHIPPED | OUTPATIENT
Start: 2025-08-20

## 2025-08-20 ASSESSMENT — PAIN SCALES - GENERAL: PAINLEVEL_OUTOF10: NO PAIN (0)

## 2025-08-21 DIAGNOSIS — J47.1 BRONCHIECTASIS WITH ACUTE EXACERBATION (H): Primary | ICD-10-CM

## 2025-08-21 LAB
BACTERIA SPT CULT: NORMAL
GRAM STAIN RESULT: NORMAL

## 2025-08-21 RX ORDER — DOXYCYCLINE HYCLATE 50 MG/1
100 CAPSULE ORAL 2 TIMES DAILY
Qty: 20 CAPSULE | Refills: 0 | Status: SHIPPED | OUTPATIENT
Start: 2025-08-21

## 2025-08-24 LAB
BACTERIA SPT CULT: ABNORMAL
BACTERIA SPT CULT: ABNORMAL
GRAM STAIN RESULT: ABNORMAL